# Patient Record
Sex: FEMALE | Race: WHITE | HISPANIC OR LATINO | Employment: OTHER | URBAN - METROPOLITAN AREA
[De-identification: names, ages, dates, MRNs, and addresses within clinical notes are randomized per-mention and may not be internally consistent; named-entity substitution may affect disease eponyms.]

---

## 2016-07-29 LAB
CHLAMYDIA, NUC. ACID AMP: NEGATIVE
GONOCOCCUS, NUC. ACID AMP: NEGATIVE
PAP SMEAR: NORMAL
TRICH VAG BY NAA: NEGATIVE

## 2017-04-26 ENCOUNTER — TELEPHONE (OUTPATIENT)
Dept: HEMATOLOGY/ONCOLOGY | Facility: CLINIC | Age: 62
End: 2017-04-26

## 2017-04-26 NOTE — TELEPHONE ENCOUNTER
----- Message from Orly Oliva RN sent at 4/26/2017  9:50 AM CDT -----  Contact: PT/       ----- Message -----     From: Doris Malin     Sent: 4/26/2017   9:00 AM       To: Collins Villaseñor Staff    Would like tos schedule an appt, call: [phone]     Patient need a letter to return back to work.

## 2017-05-05 ENCOUNTER — OFFICE VISIT (OUTPATIENT)
Dept: HEMATOLOGY/ONCOLOGY | Facility: CLINIC | Age: 62
End: 2017-05-05
Payer: MEDICAID

## 2017-05-05 VITALS
BODY MASS INDEX: 34.16 KG/M2 | SYSTOLIC BLOOD PRESSURE: 170 MMHG | WEIGHT: 174 LBS | OXYGEN SATURATION: 100 % | RESPIRATION RATE: 18 BRPM | HEIGHT: 60 IN | DIASTOLIC BLOOD PRESSURE: 81 MMHG | TEMPERATURE: 98 F | HEART RATE: 65 BPM

## 2017-05-05 DIAGNOSIS — D05.12 DUCTAL CARCINOMA IN SITU (DCIS) OF LEFT BREAST: Primary | ICD-10-CM

## 2017-05-05 PROCEDURE — 99213 OFFICE O/P EST LOW 20 MIN: CPT | Mod: S$PBB,,, | Performed by: INTERNAL MEDICINE

## 2017-05-05 PROCEDURE — 99999 PR PBB SHADOW E&M-EST. PATIENT-LVL III: CPT | Mod: PBBFAC,,, | Performed by: INTERNAL MEDICINE

## 2017-05-05 PROCEDURE — 99213 OFFICE O/P EST LOW 20 MIN: CPT | Mod: PBBFAC | Performed by: INTERNAL MEDICINE

## 2017-05-05 NOTE — PROGRESS NOTES
Subjective:       Patient ID: Mandi Cline is a 61 y.o. female.    Chief Complaint: No chief complaint on file.    HPI   Ms. Zacarias returns today for follow up.     Briefly, she is a 61-year-old  female, originally from [place] who currently lives in [place], [place].  Her history dates back to August 2015 when a routine screening mammogram showed calcifications in the superior part of the left breast.  Core biopsies of 2 lesions showed high-grade DCIS.  The cells were strongly ER positive, but only 10% were mildly RI positive.  The patient was referred to Dr. Kincaid and underwent initial wire localization lumpectomy on 09/25/2015.    Pathology revealed extensive solid high-grade DCIS measuring 4 cm in diameter with extensive comedonecrosis.  Due to extremely close margins, she underwent a reexcision on 10/20/2015.  Tissue from the anterior and superior margins revealed benign breast tissue, but there was a focus of residual solid high-grade DCIS on the posterior margin, measuring 1.1 cm.  She returned to the OR on 11/12/2015 for further reexcision of the posterior margin.  Pathology revealed no residual DCIS but again a close margin at 0.6 mm..      The patient was seen by Dr. Horan and in early 2016, she underwent radiation therapy to the breast.  She completed her radiation in March, and she was referred for evaluation. I had recommended against adjuvant hormonal therapy due to prior history of DVTs x 2.    A mammogram at [facility] in March 2017 was read as BIRADS III and a 6 month follow up was recommended.      Review of Systems    Overall she feels well.   She denies any anxiety, depression, easy bruising, fevers, chills, night  sweats, weight loss, nausea, vomiting, diarrhea, constipation, diplopia,     blurred vision, headache, chest pain, palpitations, shortness of breath, breast pain, abdominal pain, extremity pain, or difficulty ambulating.  The remainder of the ten-point  ROS, including general, skin, lymph, H/N, cardiorespiratory, GI, , Neuro, Endocrine, and psychiatric is negative. ECOG PS remains 0.    Objective:      Physical Exam    She is alert, oriented to time, place, person, pleasant, well      nourished, in no acute physical distress.                                    VITAL SIGNS:  Reviewed                                      HEENT:  Normal.  There are no nasal, oral, lip, gingival, auricular, lid,    or conjunctival lesions.  Mucosae are moist and pink, and there is no        thrush.  Pupils are equal, reactive to light and accommodation.              Extraocular muscle movements are intact.    Dentition is good.  There is no frontal or maxillary tendernress                                   NECK:  Supple without JVD, adenopathy, or thyromegaly.                       LUNGS:  Clear to auscultation without wheezing, rales, or rhonchi.           CARDIOVASCULAR:  Reveals an S1, S2, no murmurs, no rubs, no gallops.         ABDOMEN:  Soft, nontender, without organomegaly.  Bowel sounds are    present.                                                                     EXTREMITIES:  No cyanosis, clubbing, or edema.                               BREASTS:  She is status post left lumpectomy with a well-healed incision.  There is mild hyperpigmentation within the radiation field  There are no masses in either breast.                                        LYMPHATIC:  There is no cervical, axillary, or supraclavicular adenopathy.   SKIN:  Warm and moist, without petechiae, rashes, induration, or ecchymoses.           NEUROLOGIC:  DTRs are 0-1+ bilaterally, symmetrical, motor function is 5/5,  and cranial nerves are  within normal limits.      Assessment:       1. DCIS (ductal carcinoma in situ) of breast, left, s/p lumpectomy and XRT               Plan:     Mrs Zacarias is doing well.  She will have a repeat mammogram in September 2017 at [facility] (her request) and see her at that  time..  Her questions were answered to her satisfaction.

## 2017-05-05 NOTE — LETTER
May 5, 2017      [facility]  [address]  [place] [address]  Phone: [phone]       Patient Mandi Cline   YOB: 1955  Date of Visit: 05/05/2017    To Whom It May Concern:    Mandi Cline  was at [facility] on 05/05/2017.   may return to work anytime  with no  restrictions. If you have any questions or concerns, or if I can be of further assistance, please do not hesitate to contact me.    Sincerely,      Kasi Mukherjee MD

## 2017-10-04 ENCOUNTER — PATIENT OUTREACH (OUTPATIENT)
Dept: ADMINISTRATIVE | Facility: HOSPITAL | Age: 62
End: 2017-10-04

## 2017-10-04 NOTE — LETTER
October 10, 2017    Mandi Cline  [address] [place] [address]             [facility]  [address]  [facility] [place] [facility] [address]  Phone: [phone] Dear Mrs. Rell Cline:    We have tried to reach you by mychart unsuccessfully.    [facility] is committed to your overall health.  To help you get the most out of each of your visits, we will review your information to make sure you are up to date on all of your recommended tests and/or procedures.       [facility], we may not have your complete medical records.  She has found that your chart shows you may be due for a mammogram and possibly some immunizations (pneumonia, tetanus, and shingles).     Medicare does not cover all immunizations to be given in the clinic.  Check your benefits to ensure that you do not need to receive your immunizations at the pharmacy.     If you have had any of the above done at another facility, please bring the records or information with you so that your record at [facility] will be complete.  If you would like to schedule any of these, please contact me.     If you are currently taking medication, please bring it with you to your appointment for review.     If you have any questions or concerns, please don't hesitate to call.    Thank you for letting us care for you,  Radha Stone LPN Clinical Care Coordinator  [facility] [place]  [phone]

## 2017-10-06 NOTE — PROGRESS NOTES
Subjective:       Patient ID: Mandi Cline is a 61 y.o. female.    Chief Complaint: No chief complaint on file.    HPI   Ms. Zacarias returns today for follow up.     Briefly, she is a 61-year-old  female, originally from [place] who currently lives in [place], [place].  Her history dates back to August 2015 when a routine screening mammogram showed calcifications in the superior part of the left breast.  Core biopsies of 2 lesions showed high-grade DCIS.  The cells were strongly ER positive, but only 10% were mildly DC positive.  The patient was referred to Dr. Kincaid and underwent initial wire localization lumpectomy on 09/25/2015.    Pathology revealed extensive solid high-grade DCIS measuring 4 cm in diameter with extensive comedonecrosis.  Due to extremely close margins, she underwent a reexcision on 10/20/2015.  Tissue from the anterior and superior margins revealed benign breast tissue, but there was a focus of residual solid high-grade DCIS on the posterior margin, measuring 1.1 cm.  She returned to the OR on 11/12/2015 for further reexcision of the posterior margin.  Pathology revealed no residual DCIS but again a close margin at 0.6 mm..      The patient was seen by Dr. Horan and in early 2016, she underwent radiation therapy to the breast.  She completed her radiation in March, and she was referred for evaluation. I had recommended against adjuvant hormonal therapy due to prior history of DVTs x 2.    A mammogram at [facility] this month was read as BIRADS III and a 12 month follow up was recommended.      Review of Systems    Overall she feels well.   She is concerned about a skin lesion on her right breast which, according to her, has increased in size and has changed in color.  She denies any depression, easy bruising, fevers, chills, night  sweats, weight loss, nausea, vomiting, diarrhea, constipation, diplopia, blurred vision, headache, chest pain, palpitations,  shortness of breath, breast pain, abdominal pain, extremity pain, or difficulty ambulating.  The remainder of the ten-point ROS, including general, skin, lymph, H/N, cardiorespiratory, GI, , Neuro, Endocrine, and psychiatric is negative. ECOG PS remains 0.    Objective:      Physical Exam    She is alert, oriented to time, place, person, pleasant, well      nourished, in no acute physical distress.                                    VITAL SIGNS:  Reviewed                                      HEENT:  Normal.  There are no nasal, oral, lip, gingival, auricular, lid,    or conjunctival lesions.  Mucosae are moist and pink, and there is no        thrush.  Pupils are equal, reactive to light and accommodation.              Extraocular muscle movements are intact.    Dentition is good.  There is no frontal or maxillary tendernress                                   NECK:  Supple without JVD, adenopathy, or thyromegaly.                       LUNGS:  Clear to auscultation without wheezing, rales, or rhonchi.           CARDIOVASCULAR:  Reveals an S1, S2, no murmurs, no rubs, no gallops.         ABDOMEN:  Soft, nontender, without organomegaly.  Bowel sounds are    present.                                                                     EXTREMITIES:  No cyanosis, clubbing, or edema.                               BREASTS:  She is status post left lumpectomy with a well-healed incision.  There is mild hyperpigmentation within the radiation field  There are no masses in either breast.  There is a small mole at the 8 o' clock position in the right breast.                                      LYMPHATIC:  There is no cervical, axillary, or supraclavicular adenopathy.   SKIN:  Warm and moist, without petechiae, rashes, induration, or ecchymoses.           NEUROLOGIC:  DTRs are 0-1+ bilaterally, symmetrical, motor function is 5/5,  and cranial nerves are  within normal limits.      Assessment:       1. DCIS (ductal carcinoma in  situ) of breast, left, s/p lumpectomy and XRT   2 Skin lesion, right breast           Plan:     From the DCIS perspective,  Mrs Zacarias is doing well.  She will see me again in 6 months.  In regards to her skin lesion, I have E mailed Dr. Schofield asking her to see Mrs. Zacarias.  I have asked Mrs Zacarias to call me and let me know if she has not heard from the Dermatology Department within 3-4 days.  Her mammogram will be repeated a year from now.  Her questions were answered to her satisfaction.

## 2017-10-09 ENCOUNTER — OFFICE VISIT (OUTPATIENT)
Dept: HEMATOLOGY/ONCOLOGY | Facility: CLINIC | Age: 62
End: 2017-10-09
Payer: MEDICAID

## 2017-10-09 VITALS
DIASTOLIC BLOOD PRESSURE: 70 MMHG | SYSTOLIC BLOOD PRESSURE: 140 MMHG | RESPIRATION RATE: 18 BRPM | WEIGHT: 189 LBS | OXYGEN SATURATION: 99 % | HEIGHT: 65 IN | BODY MASS INDEX: 31.49 KG/M2 | TEMPERATURE: 98 F

## 2017-10-09 DIAGNOSIS — D22.9 ATYPICAL MOLE: ICD-10-CM

## 2017-10-09 DIAGNOSIS — D05.12 DUCTAL CARCINOMA IN SITU (DCIS) OF LEFT BREAST: Primary | ICD-10-CM

## 2017-10-09 PROCEDURE — 99212 OFFICE O/P EST SF 10 MIN: CPT | Mod: PBBFAC | Performed by: INTERNAL MEDICINE

## 2017-10-09 PROCEDURE — 99214 OFFICE O/P EST MOD 30 MIN: CPT | Mod: S$PBB,,, | Performed by: INTERNAL MEDICINE

## 2017-10-09 PROCEDURE — 99999 PR PBB SHADOW E&M-EST. PATIENT-LVL II: CPT | Mod: PBBFAC,,, | Performed by: INTERNAL MEDICINE

## 2017-10-17 ENCOUNTER — TELEPHONE (OUTPATIENT)
Dept: FAMILY MEDICINE | Facility: CLINIC | Age: 62
End: 2017-10-17

## 2017-10-17 NOTE — TELEPHONE ENCOUNTER
Please contact patient prior to appt 10/18/17 to let her know that we are not accepting new medicaid patients

## 2017-10-18 ENCOUNTER — OFFICE VISIT (OUTPATIENT)
Dept: FAMILY MEDICINE | Facility: CLINIC | Age: 62
End: 2017-10-18
Payer: MEDICAID

## 2017-10-18 VITALS
SYSTOLIC BLOOD PRESSURE: 118 MMHG | HEIGHT: 65 IN | RESPIRATION RATE: 18 BRPM | BODY MASS INDEX: 29.57 KG/M2 | TEMPERATURE: 98 F | DIASTOLIC BLOOD PRESSURE: 62 MMHG | HEART RATE: 64 BPM | WEIGHT: 177.5 LBS

## 2017-10-18 DIAGNOSIS — R53.83 FATIGUE, UNSPECIFIED TYPE: ICD-10-CM

## 2017-10-18 DIAGNOSIS — K21.9 GASTROESOPHAGEAL REFLUX DISEASE, ESOPHAGITIS PRESENCE NOT SPECIFIED: Primary | ICD-10-CM

## 2017-10-18 DIAGNOSIS — Z23 IMMUNIZATION DUE: ICD-10-CM

## 2017-10-18 DIAGNOSIS — M25.569 KNEE PAIN, UNSPECIFIED CHRONICITY, UNSPECIFIED LATERALITY: ICD-10-CM

## 2017-10-18 DIAGNOSIS — R40.0 HAS DAYTIME DROWSINESS: ICD-10-CM

## 2017-10-18 DIAGNOSIS — Z79.899 ENCOUNTER FOR LONG-TERM CURRENT USE OF MEDICATION: ICD-10-CM

## 2017-10-18 DIAGNOSIS — Z85.3 HISTORY OF BREAST CANCER: ICD-10-CM

## 2017-10-18 PROCEDURE — 90715 TDAP VACCINE 7 YRS/> IM: CPT | Mod: S$GLB,,, | Performed by: NURSE PRACTITIONER

## 2017-10-18 PROCEDURE — 90471 IMMUNIZATION ADMIN: CPT | Mod: S$GLB,,, | Performed by: NURSE PRACTITIONER

## 2017-10-18 PROCEDURE — 90472 IMMUNIZATION ADMIN EACH ADD: CPT | Mod: S$GLB,,, | Performed by: NURSE PRACTITIONER

## 2017-10-18 PROCEDURE — 99203 OFFICE O/P NEW LOW 30 MIN: CPT | Mod: 25,S$GLB,, | Performed by: NURSE PRACTITIONER

## 2017-10-18 PROCEDURE — 90736 HZV VACCINE LIVE SUBQ: CPT | Mod: S$GLB,,, | Performed by: NURSE PRACTITIONER

## 2017-10-18 RX ORDER — PANTOPRAZOLE SODIUM 40 MG/1
40 TABLET, DELAYED RELEASE ORAL DAILY
Qty: 30 TABLET | Refills: 11 | Status: SHIPPED | OUTPATIENT
Start: 2017-10-18 | End: 2018-06-20 | Stop reason: SDUPTHER

## 2017-10-19 ENCOUNTER — OFFICE VISIT (OUTPATIENT)
Dept: DERMATOLOGY | Facility: CLINIC | Age: 62
End: 2017-10-19
Payer: MEDICAID

## 2017-10-19 DIAGNOSIS — L82.1 SEBORRHEIC KERATOSES: Primary | ICD-10-CM

## 2017-10-19 PROCEDURE — 99999 PR PBB SHADOW E&M-EST. PATIENT-LVL II: CPT | Mod: PBBFAC,,, | Performed by: NURSE PRACTITIONER

## 2017-10-19 PROCEDURE — 99201 PR OFFICE/OUTPT VISIT,NEW,LEVL I: CPT | Mod: S$PBB,,, | Performed by: NURSE PRACTITIONER

## 2017-10-19 PROCEDURE — 99212 OFFICE O/P EST SF 10 MIN: CPT | Mod: PBBFAC | Performed by: NURSE PRACTITIONER

## 2017-10-20 ENCOUNTER — TELEPHONE (OUTPATIENT)
Dept: ADMINISTRATIVE | Facility: HOSPITAL | Age: 62
End: 2017-10-20

## 2017-10-24 PROBLEM — Z85.3 HISTORY OF BREAST CANCER: Status: ACTIVE | Noted: 2017-10-24

## 2017-10-24 NOTE — PROGRESS NOTES
Subjective:       Patient ID: Mandi Cline is a 61 y.o. female.    Chief Complaint: Establish Care    The patient is here to establish care.  She is generally feeling well today without any new complaints.  She has the following active problems.  The patient is primarily Korean-speaking but does speak limited English.  1. GERD-she needs a refill on her Protonix and Zantac.  Last EGD 3/16/16 and was unremarkable.  She states she does feel acid reflux at times and takes these medications as needed.  2.  History of breast cancer-Her history dates back to August 2015 when a routine screening mammogram showed calcifications in the superior part of the left breast.  Core biopsies of 2 lesions showed high-grade DCIS.  The cells were strongly ER positive, but only 10% were mildly CT positive.  The patient was referred to Dr. Kincaid and underwent initial wire localization lumpectomy on 09/25/2015.    Pathology revealed extensive solid high-grade DCIS measuring 4 cm in diameter with extensive comedonecrosis.  Due to extremely close margins, she underwent a reexcision on 10/20/2015.  Tissue from the anterior and superior margins revealed benign breast tissue, but there was a focus of residual solid high-grade DCIS on the posterior margin, measuring 1.1 cm.  She returned to the OR on 11/12/2015 for further reexcision of the posterior margin.  Pathology revealed no residual DCIS but again a close margin at 0.6 mm..       The patient was seen by Dr. Horan and in early 2016, she underwent radiation therapy to the breast.  She completed her radiation in March, and she was referred for evaluation. I had recommended against adjuvant hormonal therapy due to prior history of DVTs x 2.     A mammogram at [facility] this month was read as BIRADS III and a 12 month follow up was recommended.  She is followed by Dr Tan-in hematology oncology.  Last visit was 10/9/17 and she has a follow-up in 6 months.  She is  "up-to-date on mammograms.    She does tell me that she has felt fatigued with daytime drowsiness.  She does snore.  She has never had a sleep study.  She also tells me that she has bilateral knee pain with "cracking" to the knees at times.  No knee instability.    She is up-to-date on her colonoscopy.  She does have a history of colon polyps.  Last colonoscopy 3/2/16 and recommended repeat in 3 years.       Review of Systems   Constitutional: Positive for fatigue. Negative for activity change and appetite change.   HENT: Negative for congestion, postnasal drip, rhinorrhea and sinus pressure.    Eyes: Negative for pain and redness.   Respiratory: Negative for choking and chest tightness.    Gastrointestinal: Negative for abdominal distention, abdominal pain, blood in stool, constipation, diarrhea, nausea and vomiting.        Heartburn   Endocrine: Negative for polydipsia and polyphagia.   Genitourinary: Negative for dysuria and hematuria.   Musculoskeletal: Positive for arthralgias. Negative for myalgias.   Skin: Negative for color change and rash.   Neurological: Negative for dizziness and headaches.   Psychiatric/Behavioral: Negative for agitation and behavioral problems.       Objective:       Vitals:    10/18/17 1206   BP: 118/62   Pulse: 64   Resp: 18   Temp: 98.3 °F (36.8 °C)   TempSrc: Oral   Weight: 80.5 kg (177 lb 7.5 oz)   Height: 5' 5" (1.651 m)   PainSc:   4   PainLoc: Knee       Physical Exam   Constitutional: She is oriented to person, place, and time. She appears well-developed and well-nourished. No distress.   HENT:   Head: Normocephalic and atraumatic.   Right Ear: Hearing, tympanic membrane, external ear and ear canal normal.   Left Ear: Hearing, tympanic membrane, external ear and ear canal normal.   Nose: Nose normal.   Mouth/Throat: Uvula is midline, oropharynx is clear and moist and mucous membranes are normal.   Eyes: Conjunctivae and EOM are normal. Pupils are equal, round, and reactive to " light. Right eye exhibits no discharge. Left eye exhibits no discharge.   Neck: Trachea normal and normal range of motion. Neck supple. No JVD present. Carotid bruit is not present. No thyromegaly present.   Cardiovascular: Normal rate and regular rhythm.  Exam reveals no gallop and no friction rub.    No murmur heard.  Pulmonary/Chest: Effort normal and breath sounds normal. No respiratory distress. She has no wheezes. She has no rales. She exhibits no tenderness.   Abdominal: Soft. Bowel sounds are normal. She exhibits no distension and no mass. There is no tenderness. There is no rebound and no guarding.   Musculoskeletal: Normal range of motion.   Mild crepitus of the knees bilaterally   Neurological: She is alert and oriented to person, place, and time. Coordination normal.   Skin: Skin is warm and dry. She is not diaphoretic.   Psychiatric: She has a normal mood and affect. Her behavior is normal. Judgment and thought content normal.       Assessment:       1. Gastroesophageal reflux disease, esophagitis presence not specified    2. Immunization due    3. Encounter for long-term current use of medication    4. Fatigue, unspecified type    5. Knee pain, unspecified chronicity, unspecified laterality    6. Has daytime drowsiness    7. History of breast cancer        Plan:       Mandi was seen today for [facility] care.    Diagnoses and all orders for this visit:    Gastroesophageal reflux disease, esophagitis presence not specified  -     pantoprazole (PROTONIX) 40 MG tablet; Take 1 tablet (40 mg total) by mouth once daily.  -     ranitidine (ZANTAC) 150 MG tablet; Take 1 tablet (150 mg total) by mouth 2 (two) times daily.    Immunization due  -     Tdap Vaccine  -     (In Office Administered) Zoster Vaccine - Live    Encounter for long-term current use of medication  -     Comprehensive metabolic panel; Future  -     CBC auto differential; Future  -     Hemoglobin A1c; Future  -     Lipid panel; Future  -     TSH;  Future    Fatigue, unspecified type  -     Vitamin B12; Future  -     Vitamin D; Future  -     Polysomnogram (CPAP will be added if patient meets diagnostic criteria.); Future  -     Home Sleep Studies; Future  -     Ambulatory consult for Sleep Study    Knee pain, unspecified chronicity, unspecified laterality  -     X-ray Knee Ortho Bilateral; Future    Has daytime drowsiness  -     Polysomnogram (CPAP will be added if patient meets diagnostic criteria.); Future  -     Home Sleep Studies; Future  -     Ambulatory consult for Sleep Study    History of breast cancer  Continue follow-up with hematology/oncology

## 2017-10-25 ENCOUNTER — HOSPITAL ENCOUNTER (OUTPATIENT)
Dept: RADIOLOGY | Facility: HOSPITAL | Age: 62
Discharge: HOME | End: 2017-10-25
Attending: NURSE PRACTITIONER
Payer: MEDICAID

## 2017-10-25 ENCOUNTER — TELEPHONE (OUTPATIENT)
Dept: FAMILY MEDICINE | Facility: CLINIC | Age: 62
End: 2017-10-25

## 2017-10-25 DIAGNOSIS — M25.569 KNEE PAIN, UNSPECIFIED CHRONICITY, UNSPECIFIED LATERALITY: Primary | ICD-10-CM

## 2017-10-25 DIAGNOSIS — M25.569 KNEE PAIN, UNSPECIFIED CHRONICITY, UNSPECIFIED LATERALITY: ICD-10-CM

## 2017-10-25 PROCEDURE — 73562 X-RAY EXAM OF KNEE 3: CPT | Mod: 26,50,, | Performed by: RADIOLOGY

## 2017-10-25 PROCEDURE — 73562 X-RAY EXAM OF KNEE 3: CPT | Mod: TC,50,PO

## 2017-10-25 NOTE — TELEPHONE ENCOUNTER
Her x-ray shows chronic changes to the knees.  I would like to book her with orthopedics to discuss treatment options.

## 2017-10-26 ENCOUNTER — TELEPHONE (OUTPATIENT)
Dept: ORTHOPEDICS | Facility: CLINIC | Age: 62
End: 2017-10-26

## 2017-10-26 DIAGNOSIS — E55.9 VITAMIN D DEFICIENCY: Primary | ICD-10-CM

## 2017-10-26 NOTE — TELEPHONE ENCOUNTER
Notified pt of results and referral through . Pt stated verbal understanding. Attempted to schedule referral. One available appointment on Saturday, 10/28/2017. Pt stated no transportation on weekends and needs to have weekday appointment. Can pt be scheduled on a different day? Referral in. Pt requires . Language is Serbian. Please advise.

## 2017-10-26 NOTE — TELEPHONE ENCOUNTER
----- Message from Maribel Lundy sent at 10/26/2017  9:04 AM CDT -----  Contact: alysha Yu call   [phone]

## 2018-02-06 NOTE — TELEPHONE ENCOUNTER
This is still in my open encounters, can you check to see if anyone reached out to her from ortho?

## 2018-02-08 ENCOUNTER — TELEPHONE (OUTPATIENT)
Dept: ORTHOPEDICS | Facility: CLINIC | Age: 63
End: 2018-02-08

## 2018-02-08 NOTE — TELEPHONE ENCOUNTER
First available appt is in September with Dr. Calderón.  Please offer medicaid escalation line in she would like to get in sooner.

## 2018-02-14 ENCOUNTER — TELEPHONE (OUTPATIENT)
Dept: HEMATOLOGY/ONCOLOGY | Facility: CLINIC | Age: 63
End: 2018-02-14

## 2018-02-14 NOTE — TELEPHONE ENCOUNTER
Pt daughter contacted, medicaid esc. number given, verbalized understanding of al no further needs

## 2018-02-14 NOTE — TELEPHONE ENCOUNTER
----- Message from Orly Oliva RN sent at 2/14/2018  2:11 PM CST -----  Contact: Patient       ----- Message -----  From: Nasima Montes  Sent: 2/14/2018   1:58 PM  To: Collins Villaseñor Staff    Patient wants to schedule an appointment in April.  Please call the patient [phone].

## 2018-02-21 ENCOUNTER — PATIENT OUTREACH (OUTPATIENT)
Dept: ADMINISTRATIVE | Facility: HOSPITAL | Age: 63
End: 2018-02-21

## 2018-02-21 NOTE — LETTER
February 27, 2018    Mandi Cline  [address]  [place] [address]             [facility]  [address] [place], [place] [address]  [facility] [address]  Phone: [phone] Dear Mrs. Rell Cline:    We have tried to reach you by mychart unsuccessfully.  [facility] is committed to your overall health.  To help you get the most out of each of your visits, we will review your information to make sure you are up to date on all of your recommended tests and/or procedures.       COLEMAN Desai has found that your chart shows you may be due for a pneumonia immunization.     Medicare does not cover all immunizations to be given in the clinic.  Check your benefits to ensure that you do not need to receive your immunizations at the pharmacy.     If you have had any of the above done at another facility, please bring the records or information with you so that your record at [facility] will be complete.  If you would like to schedule any of these, please contact me.     If you are currently taking medication, please bring it with you to your appointment for review.     If you have any questions or concerns, please don't hesitate to call.    Thank you for letting us care for you,  Radha Stone LPN Clinical Care Coordinator  [facility] [place]  [phone]

## 2018-03-07 ENCOUNTER — OFFICE VISIT (OUTPATIENT)
Dept: FAMILY MEDICINE | Facility: CLINIC | Age: 63
End: 2018-03-07
Payer: MEDICAID

## 2018-03-07 VITALS
TEMPERATURE: 99 F | WEIGHT: 183.31 LBS | DIASTOLIC BLOOD PRESSURE: 72 MMHG | BODY MASS INDEX: 30.54 KG/M2 | RESPIRATION RATE: 18 BRPM | SYSTOLIC BLOOD PRESSURE: 124 MMHG | HEIGHT: 65 IN | HEART RATE: 72 BPM

## 2018-03-07 DIAGNOSIS — E55.9 VITAMIN D DEFICIENCY: ICD-10-CM

## 2018-03-07 DIAGNOSIS — K21.9 GASTROESOPHAGEAL REFLUX DISEASE, ESOPHAGITIS PRESENCE NOT SPECIFIED: ICD-10-CM

## 2018-03-07 DIAGNOSIS — R73.03 PREDIABETES: Primary | ICD-10-CM

## 2018-03-07 DIAGNOSIS — Z85.3 HISTORY OF BILATERAL BREAST CANCER: ICD-10-CM

## 2018-03-07 DIAGNOSIS — Z85.3 HISTORY OF BREAST CANCER: ICD-10-CM

## 2018-03-07 DIAGNOSIS — M25.561 RIGHT KNEE PAIN, UNSPECIFIED CHRONICITY: ICD-10-CM

## 2018-03-07 DIAGNOSIS — E66.9 OBESITY (BMI 30.0-34.9): ICD-10-CM

## 2018-03-07 DIAGNOSIS — Z79.899 ENCOUNTER FOR LONG-TERM CURRENT USE OF MEDICATION: ICD-10-CM

## 2018-03-07 DIAGNOSIS — Z23 IMMUNIZATION DUE: ICD-10-CM

## 2018-03-07 PROCEDURE — 20610 DRAIN/INJ JOINT/BURSA W/O US: CPT | Mod: RT,S$GLB,, | Performed by: NURSE PRACTITIONER

## 2018-03-07 PROCEDURE — 99214 OFFICE O/P EST MOD 30 MIN: CPT | Mod: 25,S$GLB,, | Performed by: NURSE PRACTITIONER

## 2018-03-07 PROCEDURE — 90732 PPSV23 VACC 2 YRS+ SUBQ/IM: CPT | Mod: S$GLB,,, | Performed by: FAMILY MEDICINE

## 2018-03-07 RX ORDER — TRIAMCINOLONE ACETONIDE 40 MG/ML
80 INJECTION, SUSPENSION INTRA-ARTICULAR; INTRAMUSCULAR
Status: DISCONTINUED | OUTPATIENT
Start: 2018-03-07 | End: 2018-03-07 | Stop reason: HOSPADM

## 2018-03-07 RX ADMIN — TRIAMCINOLONE ACETONIDE 80 MG: 40 INJECTION, SUSPENSION INTRA-ARTICULAR; INTRAMUSCULAR at 11:03

## 2018-03-07 NOTE — PROGRESS NOTES
Subjective:       Patient ID: Mandi Cline is a 62 y.o. female.    Chief Complaint: Follow-up    The patient is here for a 6 month follow up visit. She is generally feeling well today without any new complaints.  She has the following active problems.  The patient is primarily Turkish-speaking but does speak limited English.  1. GERD-she needs a refill on her Protonix and Zantac.  Last EGD 3/16/16 and was unremarkable.  She states she does feel acid reflux at times and takes these medications as needed.  2.  History of breast cancer-Her history dates back to August 2015 when a routine screening mammogram showed calcifications in the superior part of the left breast.  Core biopsies of 2 lesions showed high-grade DCIS.  The cells were strongly ER positive, but only 10% were mildly AL positive.  The patient was referred to Dr. Kincaid and underwent initial wire localization lumpectomy on 09/25/2015.  She has an upcoming follow up with hem/oc.  Pathology revealed extensive solid high-grade DCIS measuring 4 cm in diameter with extensive comedonecrosis.  Due to extremely close margins, she underwent a reexcision on 10/20/2015.  Tissue from the anterior and superior margins revealed benign breast tissue, but there was a focus of residual solid high-grade DCIS on the posterior margin, measuring 1.1 cm.  She returned to the OR on 11/12/2015 for further reexcision of the posterior margin.  Pathology revealed no residual DCIS but again a close margin at 0.6 mm..       The patient was seen by Dr. Horan and in early 2016, she underwent radiation therapy to the breast.  She completed her radiation in March, and she was referred for evaluation. I had recommended against adjuvant hormonal therapy due to prior history of DVTs x 2.     A mammogram at [facility] this month was read as BIRADS III and a 12 month follow up was recommended.  She is followed by Dr Tan-in hematology oncology.  Last visit was  "10/9/17 and she has a follow-up in 6 months. She needs an order for this.      3. Knee pain-bilateral knee pain. Right knee worse than left. X-ray done 10/25/18.  Medial compartment narrowing on the right is noted. Osseous demineralization is noted diffusely. The lateral spurring is noted off of the femur and tibia on the left more noticeable than right.   A minimal suprapatellar joint effusion on the right is noted. A small suprapatellar joint effusion on the left is noted.   Superior and inferior patellar articular surface spurring is noted on the left.   We referred her to ortho but she was unable to book the appt.   4. Prediabetes-Lab Results       Component                Value               Date                       HGBA1C                   6.0 (H)             10/25/2017            5. Vitamin d deficiency-last vi d 20. On OTC vitamin d   She is up-to-date on her colonoscopy.  She does have a history of colon polyps.  Last colonoscopy 3/2/16 and recommended repeat in 3 years.       Review of Systems   Constitutional: Negative for activity change and appetite change.   HENT: Negative for congestion, postnasal drip, rhinorrhea and sinus pressure.    Eyes: Negative for pain and redness.   Respiratory: Negative for choking and chest tightness.    Gastrointestinal: Negative for abdominal distention, abdominal pain, blood in stool, constipation, diarrhea, nausea and vomiting.   Endocrine: Negative for polydipsia and polyphagia.   Genitourinary: Negative for dysuria and hematuria.   Musculoskeletal: Positive for arthralgias. Negative for myalgias.   Skin: Negative for color change and rash.   Neurological: Negative for dizziness and headaches.   Psychiatric/Behavioral: Negative for agitation and behavioral problems.       Objective:       Vitals:    03/07/18 0901   BP: 124/72   Pulse: 72   Resp: 18   Temp: 98.5 °F (36.9 °C)   TempSrc: Oral   Weight: 83.2 kg (183 lb 5 oz)   Height: 5' 5" (1.651 m)   PainSc:   6 "   PainLoc: Knee       Physical Exam   Constitutional: She is oriented to person, place, and time. She appears well-developed. No distress.   Obese female    HENT:   Head: Normocephalic and atraumatic.   Right Ear: Hearing, tympanic membrane, external ear and ear canal normal.   Left Ear: Hearing, tympanic membrane, external ear and ear canal normal.   Nose: Nose normal.   Mouth/Throat: Uvula is midline, oropharynx is clear and moist and mucous membranes are normal.   Eyes: Conjunctivae and EOM are normal. Pupils are equal, round, and reactive to light. Right eye exhibits no discharge. Left eye exhibits no discharge.   Neck: Trachea normal and normal range of motion. Neck supple. No JVD present. Carotid bruit is not present. No thyromegaly present.   Cardiovascular: Normal rate and regular rhythm.  Exam reveals no gallop and no friction rub.    No murmur heard.  Pulmonary/Chest: Effort normal and breath sounds normal. No respiratory distress. She has no wheezes. She has no rales. She exhibits no tenderness.   Abdominal: Soft. Bowel sounds are normal. She exhibits no distension and no mass. There is no tenderness. There is no rebound and no guarding.   Musculoskeletal: Normal range of motion.   Neurological: She is alert and oriented to person, place, and time. Coordination normal.   Skin: Skin is warm and dry. She is not diaphoretic.   Psychiatric: She has a normal mood and affect. Her behavior is normal. Judgment and thought content normal.       Assessment:       1. Prediabetes    2. Right knee pain, unspecified chronicity    3. Obesity (BMI 30.0-34.9)    4. Vitamin D deficiency    5. Encounter for long-term current use of medication    6. History of bilateral breast cancer    7. History of breast cancer    8. Immunization due    9. Gastroesophageal reflux disease, esophagitis presence not specified        Plan:       Mandi was seen today for follow-up.    Diagnoses and all orders for this visit:    Prediabetes  -      Hemoglobin A1c; Future    Right knee pain, unspecified chronicity  -     Large Joint Aspiration/Injection  -     triamcinolone acetonide injection 80 mg; Inject 80 mg into the articular space.  Follow up with ortho     Obesity (BMI 30.0-34.9)  Weight loss discussed    Vitamin D deficiency  -     Vitamin D; Future  Continue vitamin d     Encounter for long-term current use of medication  -     Comprehensive metabolic panel; Future  -     Lipid panel; Future    History of bilateral breast cancer    History of breast cancer  -     Ambulatory consult to Hematology / Oncology  -     Cancel: Mammo Digital Diagnostic Left with CAD; Future  -     Mammo Digital Diagnostic Left with CAD; Future  -     Mammo Digital Diagnostic Left with CAD    Immunization due  -     Pneumococcal Polysaccharide Vaccine (23 Valent) (SQ/IM)    Gastroesophageal reflux disease, esophagitis presence not specified    Continue current meds

## 2018-03-07 NOTE — PROCEDURES
Large Joint Aspiration/Injection  Date/Time: 3/7/2018 11:23 AM  Performed by: LILLIAN FLETCHER  Authorized by: LILLIAN FLETCHER     Consent Done?:  Yes (Verbal)  Indications:  Pain  Procedure site marked: Yes    Timeout: Prior to procedure the correct patient, procedure, and site was verified      Location:  Knee  Site:  R knee  Prep: Patient was prepped and draped in usual sterile fashion    Needle size:  21 G  Approach:  Anteromedial  Medications:  80 mg triamcinolone acetonide 40 mg/mL  Patient tolerance:  Patient tolerated the procedure well with no immediate complications

## 2018-03-12 ENCOUNTER — TELEPHONE (OUTPATIENT)
Dept: FAMILY MEDICINE | Facility: CLINIC | Age: 63
End: 2018-03-12

## 2018-03-12 NOTE — TELEPHONE ENCOUNTER
----- Message from Nadine Valdes sent at 3/12/2018  1:49 PM CDT -----  Contact: Donald berry  Want to speak with a nurse need clarification on orders please call back at [phone]     Patient is at office waiting

## 2018-03-13 ENCOUNTER — LAB VISIT (OUTPATIENT)
Dept: LAB | Facility: HOSPITAL | Age: 63
End: 2018-03-13
Attending: NURSE PRACTITIONER
Payer: MEDICAID

## 2018-03-13 DIAGNOSIS — R73.03 PREDIABETES: ICD-10-CM

## 2018-03-13 DIAGNOSIS — Z79.899 ENCOUNTER FOR LONG-TERM CURRENT USE OF MEDICATION: ICD-10-CM

## 2018-03-13 DIAGNOSIS — E55.9 VITAMIN D DEFICIENCY: ICD-10-CM

## 2018-03-13 LAB
25(OH)D3+25(OH)D2 SERPL-MCNC: 15 NG/ML
ALBUMIN SERPL BCP-MCNC: 3.7 G/DL
ALP SERPL-CCNC: 75 U/L
ALT SERPL W/O P-5'-P-CCNC: 12 U/L
ANION GAP SERPL CALC-SCNC: 7 MMOL/L
AST SERPL-CCNC: 11 U/L
BILIRUB SERPL-MCNC: 0.4 MG/DL
BUN SERPL-MCNC: 17 MG/DL
CALCIUM SERPL-MCNC: 9.4 MG/DL
CHLORIDE SERPL-SCNC: 103 MMOL/L
CHOLEST SERPL-MCNC: 157 MG/DL
CHOLEST/HDLC SERPL: 3.3 {RATIO}
CO2 SERPL-SCNC: 31 MMOL/L
CREAT SERPL-MCNC: 0.9 MG/DL
EST. GFR  (AFRICAN AMERICAN): >60 ML/MIN/1.73 M^2
EST. GFR  (NON AFRICAN AMERICAN): >60 ML/MIN/1.73 M^2
ESTIMATED AVG GLUCOSE: 128 MG/DL
GLUCOSE SERPL-MCNC: 123 MG/DL
HBA1C MFR BLD HPLC: 6.1 %
HDLC SERPL-MCNC: 47 MG/DL
HDLC SERPL: 29.9 %
LDLC SERPL CALC-MCNC: 92.2 MG/DL
NONHDLC SERPL-MCNC: 110 MG/DL
POTASSIUM SERPL-SCNC: 4.7 MMOL/L
PROT SERPL-MCNC: 8.3 G/DL
SODIUM SERPL-SCNC: 141 MMOL/L
TRIGL SERPL-MCNC: 89 MG/DL

## 2018-03-13 PROCEDURE — 82306 VITAMIN D 25 HYDROXY: CPT

## 2018-03-13 PROCEDURE — 83036 HEMOGLOBIN GLYCOSYLATED A1C: CPT

## 2018-03-13 PROCEDURE — 36415 COLL VENOUS BLD VENIPUNCTURE: CPT | Mod: PO

## 2018-03-13 PROCEDURE — 80053 COMPREHEN METABOLIC PANEL: CPT

## 2018-03-13 PROCEDURE — 80061 LIPID PANEL: CPT

## 2018-03-16 ENCOUNTER — TELEPHONE (OUTPATIENT)
Dept: FAMILY MEDICINE | Facility: CLINIC | Age: 63
End: 2018-03-16

## 2018-03-16 RX ORDER — ERGOCALCIFEROL 1.25 MG/1
50000 CAPSULE ORAL
Qty: 9 CAPSULE | Refills: 0 | Status: SHIPPED | OUTPATIENT
Start: 2018-03-16 | End: 2018-03-20 | Stop reason: SDUPTHER

## 2018-03-16 NOTE — TELEPHONE ENCOUNTER
Pt called/ spoke with daughter advised of providers message. Verbalized understanding no further needs

## 2018-03-16 NOTE — TELEPHONE ENCOUNTER
Please let the patient know that her vitamin D was extremely low.  I have sent an prescription vitamin D for 2 months but I would also like her to start vitamin D3 2000 units daily now.  Her blood sugar is climbing towards diabetes so we do need her to watch her diet and increase exercise as tolerated.  All of the rest of the labs are fine.    Let her know that her mammogram showed no evidence of malignancy but they think this is incomplete because they are missing her imaging from 2016 in 2017 from [facility].  I am assuming that they want us to try to get this imaging?  Perhaps call [facility] to see what they want us to do as I have never seen this on a mammogram before.

## 2018-03-20 NOTE — TELEPHONE ENCOUNTER
----- Message from Ny Cruz sent at 3/20/2018 11:07 AM CDT -----  Contact: Grand daughter Adal   Grand daughter Adal  States medication was sent to the wrong Pharmacy     ergocalciferol (ERGOCALCIFEROL) 50,000 unit [facility] - [place], [place] - [address]  [address]  [place] [address]  Phone: [phone] Fax: [phone]

## 2018-03-21 RX ORDER — ERGOCALCIFEROL 1.25 MG/1
50000 CAPSULE ORAL
Qty: 9 CAPSULE | Refills: 0 | Status: SHIPPED | OUTPATIENT
Start: 2018-03-21 | End: 2018-03-28 | Stop reason: SDUPTHER

## 2018-03-28 RX ORDER — ERGOCALCIFEROL 1.25 MG/1
50000 CAPSULE ORAL
Qty: 9 CAPSULE | Refills: 0 | Status: SHIPPED | OUTPATIENT
Start: 2018-03-28 | End: 2018-03-29 | Stop reason: SDUPTHER

## 2018-03-28 NOTE — TELEPHONE ENCOUNTER
----- Message from Elizabeth Joseph sent at 3/28/2018 11:04 AM CDT -----  Contact: Grand Daughter-Eladio - [phone]  Patient's grand daughter called stating the rx vitamin D was erx to the wrong [facility] in [place]. The correct pharmacy is [facility] in [facility].. Thanks!

## 2018-03-29 RX ORDER — ERGOCALCIFEROL 1.25 MG/1
50000 CAPSULE ORAL
Qty: 9 CAPSULE | Refills: 0 | Status: SHIPPED | OUTPATIENT
Start: 2018-03-29 | End: 2018-08-20 | Stop reason: SDUPTHER

## 2018-04-03 ENCOUNTER — OFFICE VISIT (OUTPATIENT)
Dept: HEMATOLOGY/ONCOLOGY | Facility: CLINIC | Age: 63
End: 2018-04-03
Payer: MEDICAID

## 2018-04-03 VITALS
SYSTOLIC BLOOD PRESSURE: 107 MMHG | HEIGHT: 65 IN | WEIGHT: 181.44 LBS | TEMPERATURE: 98 F | HEART RATE: 73 BPM | RESPIRATION RATE: 20 BRPM | DIASTOLIC BLOOD PRESSURE: 56 MMHG | BODY MASS INDEX: 30.23 KG/M2 | OXYGEN SATURATION: 94 %

## 2018-04-03 DIAGNOSIS — J45.909 ASTHMA, UNSPECIFIED ASTHMA SEVERITY, UNSPECIFIED WHETHER COMPLICATED, UNSPECIFIED WHETHER PERSISTENT: ICD-10-CM

## 2018-04-03 DIAGNOSIS — Z85.3 HISTORY OF BREAST CANCER: Primary | ICD-10-CM

## 2018-04-03 PROCEDURE — 99999 PR PBB SHADOW E&M-EST. PATIENT-LVL III: CPT | Mod: PBBFAC,,, | Performed by: NURSE PRACTITIONER

## 2018-04-03 PROCEDURE — 99213 OFFICE O/P EST LOW 20 MIN: CPT | Mod: PBBFAC | Performed by: NURSE PRACTITIONER

## 2018-04-03 PROCEDURE — 99213 OFFICE O/P EST LOW 20 MIN: CPT | Mod: S$PBB,,, | Performed by: NURSE PRACTITIONER

## 2018-04-03 RX ORDER — PREDNISONE 10 MG/1
TABLET ORAL DAILY
COMMUNITY
End: 2018-04-13 | Stop reason: ALTCHOICE

## 2018-04-03 NOTE — LETTER
April 3, 2018      Pamella Heard NP  [address]  [facility] [address]           [facility]  [address]  [place] [address]  Phone: [phone]          Patient: Mandi Cline   MR Number: [id number]   YOB: 1955   Date of Visit: 4/3/2018       Dear Pamella Heard:    Thank you for referring Mandi Cline to me for evaluation. Attached you will find relevant portions of my assessment and plan of care.    If you have questions, please do not hesitate to call me. I look forward to following Mandi Cline along with you.    Sincerely,    Jeri Cervantes NP    Enclosure  CC:  No Recipients    If you would like to receive this communication electronically, please contact [email or web address] or [phone] to request more information on [employer] Link access.    For providers and/or their staff who would like to refer a patient to [facility], please contact us through our one-stop-shop provider referral line, [facility], at [phone].    If you feel you have received this communication in error or would no longer like to receive these types of communications, please e-mail [email or web address]

## 2018-04-03 NOTE — PROGRESS NOTES
Subjective:       Patient ID: Mandi Cline is a 62 y.o. female.    Chief Complaint: Follow-up    HPI   Ms. Zacarias returns today for follow up.     Briefly, she is a 62-year-old  female, originally from [place] who currently lives in [place], [place].  Her history dates back to August 2015 when a routine screening mammogram showed calcifications in the superior part of the left breast.  Core biopsies of 2 lesions showed high-grade DCIS.  The cells were strongly ER positive, but only 10% were mildly AK positive.  The patient was referred to Dr. Kincaid and underwent initial wire localization lumpectomy on 09/25/2015.    Pathology revealed extensive solid high-grade DCIS measuring 4 cm in diameter with extensive comedonecrosis.  Due to extremely close margins, she underwent a reexcision on 10/20/2015.  Tissue from the anterior and superior margins revealed benign breast tissue, but there was a focus of residual solid high-grade DCIS on the posterior margin, measuring 1.1 cm.  She returned to the OR on 11/12/2015 for further reexcision of the posterior margin.  Pathology revealed no residual DCIS but again a close margin at 0.6 mm..      The patient was seen by Dr. Horan and in early 2016, she underwent radiation therapy to the breast.  She completed her radiation in March, and she was referred for evaluation. I had recommended against adjuvant hormonal therapy due to prior history of DVTs x 2.    A mammogram 3/12/18  was read as BIRADS 2 and a routine screening mammogram is recommended in 1 year.     Review of Systems    Overall she feels well.  She recently had an asthma attack that required an ED visit. She is better today and is using her inhaler qid.  She denies any depression, easy bruising, fevers, chills, night sweats, weight loss, nausea, vomiting, diarrhea, constipation, diplopia, blurred vision, headache, chest pain, palpitations, shortness of breath, breast pain, abdominal  pain, extremity pain, or difficulty ambulating.  The remainder of the ten-point ROS, including general, skin, lymph, H/N, cardiorespiratory, GI, , Neuro, Endocrine, and psychiatric is negative. ECOG PS remains 0.    Objective:      Physical Exam    She is alert, oriented to time, place, person, pleasant, well      nourished, in no acute physical distress.                                    VITAL SIGNS:  Reviewed                                      HEENT:  Normal.  There are no nasal, oral, lip, gingival, auricular, lid,    or conjunctival lesions.  Mucosae are moist and pink, and there is no        thrush.  Pupils are equal, reactive to light and accommodation.              Extraocular muscle movements are intact.    Dentition is good.  There is no frontal or maxillary tendernress                                   NECK:  Supple without JVD, adenopathy, or thyromegaly.                       LUNGS:  Clear to auscultation without wheezing, rales, or rhonchi.           CARDIOVASCULAR:  Reveals an S1, S2, no murmurs, no rubs, no gallops.         ABDOMEN:  Soft, nontender, without organomegaly.  Bowel sounds are    present.                                                                     EXTREMITIES:  No cyanosis, clubbing, or edema.                               BREASTS:  She is status post left lumpectomy with a well-healed incision.  There is mild hyperpigmentation within the radiation field  There are no masses in either breast.  There is a small mole at the 8 o' clock position in the right breast.                                      LYMPHATIC:  There is no cervical, axillary, or supraclavicular adenopathy.   SKIN:  Warm and moist, without petechiae, rashes, induration, or ecchymoses.           NEUROLOGIC:  DTRs are 0-1+ bilaterally, symmetrical, motor function is 5/5,  and cranial nerves are  within normal limits.      Assessment:       1. DCIS (ductal carcinoma in situ) of breast, left, s/p lumpectomy and XRT    2 Asthma             Plan:       From the DCIS perspective,  Mrs Zacarias is doing well.  She will see Dr. Durand in 6 months.  Her mammogram will be repeated a year from now.    Patient is in agreement with the proposed treatment plan. All questions were answered to the patient's satisfaction. Pt knows to call clinic if anything is needed before the next clinic visit.      AMANDA CarnesP-C  Hematology & Oncology  [address] [place] [address]  ph. [phone]  Fax. [phone]     30 minutes were spent in coordination of patient's care, record review and counseling. More than 50% of the time was face-to-face.                Distress Screening Results: Psychosocial Distress screening score of Distress Score: 0 noted and reviewed. No intervention indicated.

## 2018-04-05 ENCOUNTER — TELEPHONE (OUTPATIENT)
Dept: FAMILY MEDICINE | Facility: CLINIC | Age: 63
End: 2018-04-05

## 2018-04-05 NOTE — TELEPHONE ENCOUNTER
Spoke with pt and explained that Leta does not have appointments after 4pm, but that Emily is in the office on Saturday. She will call back re transportation.

## 2018-04-05 NOTE — TELEPHONE ENCOUNTER
----- Message from Beryl Clements sent at 4/5/2018  4:10 PM CDT -----  Contact: self [phone]  She is calling back to say that she can take the appt on Friday, 4/13 in the morning.  Please call her.  Thank you!

## 2018-04-05 NOTE — TELEPHONE ENCOUNTER
----- Message from Ashok Mcknight sent at 4/5/2018  3:47 PM CDT -----  Contact: Mandi  Calling to get an appointment Monday after 4 pm she is having an issue with her asthma. she also said her prescription is at the wrong place. Please call [phone] (home)   Rxergocalciferol (ERGOCALCIFEROL) 50,000 unit [facility]    [facility]  [address].  thanks

## 2018-04-13 ENCOUNTER — OFFICE VISIT (OUTPATIENT)
Dept: FAMILY MEDICINE | Facility: CLINIC | Age: 63
End: 2018-04-13
Payer: MEDICAID

## 2018-04-13 VITALS
BODY MASS INDEX: 29.17 KG/M2 | HEIGHT: 65 IN | SYSTOLIC BLOOD PRESSURE: 124 MMHG | HEART RATE: 76 BPM | RESPIRATION RATE: 20 BRPM | WEIGHT: 175.06 LBS | DIASTOLIC BLOOD PRESSURE: 78 MMHG | TEMPERATURE: 98 F

## 2018-04-13 DIAGNOSIS — J45.20 MILD INTERMITTENT ASTHMA WITHOUT COMPLICATION: Primary | ICD-10-CM

## 2018-04-13 PROCEDURE — 99214 OFFICE O/P EST MOD 30 MIN: CPT | Mod: S$GLB,,, | Performed by: NURSE PRACTITIONER

## 2018-04-13 RX ORDER — ALBUTEROL SULFATE 90 UG/1
AEROSOL, METERED RESPIRATORY (INHALATION)
Refills: 0 | COMMUNITY
Start: 2018-04-01 | End: 2019-09-04

## 2018-04-13 RX ORDER — MONTELUKAST SODIUM 10 MG/1
10 TABLET ORAL NIGHTLY
Qty: 30 TABLET | Refills: 11 | Status: SHIPPED | OUTPATIENT
Start: 2018-04-13 | End: 2020-02-26 | Stop reason: SDUPTHER

## 2018-04-13 RX ORDER — PREDNISONE 20 MG/1
TABLET ORAL
Qty: 18 TABLET | Refills: 0 | Status: SHIPPED | OUTPATIENT
Start: 2018-04-13 | End: 2019-06-17 | Stop reason: ALTCHOICE

## 2018-04-13 NOTE — PROGRESS NOTES
"Subjective:       Patient ID: Mandi Cline is a 62 y.o. female.    Chief Complaint: Asthma    The patient is here to discuss asthma.  She has a history of asthma but this is a new problem for her and she has not had problems with her asthma in about 5 years.  Pt went to ER 4/1/18 with asthma attack. The patient tells me they gave her about 5 days of steroids and albuterol inhaler.  She still feels like she is wheezing at night.  No fever or chills.  She states she has not had asthma problems in many years.  She denies any shortness of breath.      Review of Systems   Constitutional: Negative for activity change and appetite change.   HENT: Negative for congestion, postnasal drip, rhinorrhea and sinus pressure.    Eyes: Negative for pain and redness.   Respiratory: Positive for cough, chest tightness and wheezing. Negative for choking.    Gastrointestinal: Negative for abdominal distention, abdominal pain, blood in stool, constipation, diarrhea, nausea and vomiting.   Endocrine: Negative for polydipsia and polyphagia.   Genitourinary: Negative for dysuria and hematuria.   Musculoskeletal: Negative for arthralgias and myalgias.   Skin: Negative for color change and rash.   Neurological: Negative for dizziness and headaches.   Psychiatric/Behavioral: Negative for agitation and behavioral problems.       Objective:       Vitals:    04/13/18 1111   BP: 124/78   Pulse: 76   Resp: 20   Temp: 98.1 °F (36.7 °C)   TempSrc: Oral   Weight: 79.4 kg (175 lb 0.7 oz)   Height: 5' 5" (1.651 m)   PainSc: 0-No pain       Physical Exam   Constitutional: She is oriented to person, place, and time. She appears well-developed and well-nourished.   HENT:   Head: Normocephalic and atraumatic.   Right Ear: External ear normal.   Left Ear: External ear normal.   Nose: Nose normal.   Mouth/Throat: Oropharynx is clear and moist.   Eyes: Conjunctivae are normal. Right eye exhibits no discharge. Left eye exhibits no discharge. No " scleral icterus.   Neck: Normal range of motion. Neck supple. No tracheal deviation present.   Cardiovascular: Normal rate, regular rhythm and normal heart sounds.  Exam reveals no friction rub.    No murmur heard.  Pulmonary/Chest: Effort normal. No stridor. No respiratory distress. She has wheezes. She has no rales. She exhibits no tenderness.   Mild expiratory wheezing noted throughout lung fields.  Pulse ox 98%.     Musculoskeletal: Normal range of motion.   Lymphadenopathy:     She has no cervical adenopathy.   Neurological: She is alert and oriented to person, place, and time.   Skin: Skin is warm and dry.   Psychiatric: She has a normal mood and affect.       Assessment:       1. Mild intermittent asthma without complication        Plan:       Mandi was seen today for asthma.    Diagnoses and all orders for this visit:    Mild intermittent asthma without complication  -     predniSONE (DELTASONE) 20 MG tablet; Take 3 tablets daily for 3 days, then 2 tablets daily for 3 days, then 1 tablet daily for 3 days.  -     montelukast (SINGULAIR) 10 mg tablet; Take 1 tablet (10 mg total) by mouth every evening.    The patient has a follow-up with me in 2 months and we will reevaluate asthma at that time.  She will notify me sooner if she has any worsening of symptoms.

## 2018-06-20 ENCOUNTER — TELEPHONE (OUTPATIENT)
Dept: FAMILY MEDICINE | Facility: CLINIC | Age: 63
End: 2018-06-20

## 2018-06-20 ENCOUNTER — OFFICE VISIT (OUTPATIENT)
Dept: FAMILY MEDICINE | Facility: CLINIC | Age: 63
End: 2018-06-20
Payer: MEDICAID

## 2018-06-20 VITALS
BODY MASS INDEX: 28.87 KG/M2 | DIASTOLIC BLOOD PRESSURE: 74 MMHG | WEIGHT: 173.31 LBS | TEMPERATURE: 98 F | HEART RATE: 76 BPM | RESPIRATION RATE: 20 BRPM | SYSTOLIC BLOOD PRESSURE: 128 MMHG | HEIGHT: 65 IN

## 2018-06-20 DIAGNOSIS — R73.03 PREDIABETES: ICD-10-CM

## 2018-06-20 DIAGNOSIS — M79.89 LEG SWELLING: Primary | ICD-10-CM

## 2018-06-20 DIAGNOSIS — E55.9 VITAMIN D DEFICIENCY: ICD-10-CM

## 2018-06-20 DIAGNOSIS — R00.2 PALPITATIONS: ICD-10-CM

## 2018-06-20 DIAGNOSIS — G89.29 CHRONIC PAIN OF RIGHT KNEE: ICD-10-CM

## 2018-06-20 DIAGNOSIS — M25.561 CHRONIC PAIN OF RIGHT KNEE: ICD-10-CM

## 2018-06-20 PROCEDURE — 99214 OFFICE O/P EST MOD 30 MIN: CPT | Mod: S$GLB,,, | Performed by: NURSE PRACTITIONER

## 2018-06-20 RX ORDER — TRAMADOL HYDROCHLORIDE 50 MG/1
TABLET ORAL
Refills: 0 | COMMUNITY
Start: 2018-04-30 | End: 2018-12-12

## 2018-06-20 NOTE — TELEPHONE ENCOUNTER
Unable to schedule pt Holter. Pt requesting appointment on Monday, July 2nd, if available. Please advise.

## 2018-06-20 NOTE — PROGRESS NOTES
"S Subjective:       Patient ID: Mandi Cline is a 62 y.o. female.    Chief Complaint: Follow-up    The patient is here for a follow-up visit.  She is feeling well today but tells me that she has been having some leg swelling on and off over the past couple months.  She has been trying to limit the amount of salt that she is taking in.  She does tell me she has been feeling some palpitations.  She denies any chest pain. She does report a dry cough.         She is still having significant knee pain and tells me she needs a referral to an outside orthopedic.    She does have a significant vitamin-D deficiency.  She is taking OTC vitamin-D daily.    Patient does have  prediabetes as well with her last hemoglobin A1c at 6.0.      Review of Systems   Constitutional: Negative for appetite change, chills and fever.   HENT: Negative for ear pain and postnasal drip.    Eyes: Negative for pain and itching.   Respiratory: Negative for chest tightness and shortness of breath.    Gastrointestinal: Negative for abdominal distention and abdominal pain.   Endocrine: Negative for polydipsia and polyuria.   Genitourinary: Negative for difficulty urinating and flank pain.   Skin: Negative for color change and pallor.   Neurological: Negative for light-headedness and headaches.   Hematological: Negative for adenopathy. Does not bruise/bleed easily.   Psychiatric/Behavioral: Negative for agitation.       Objective:       Vitals:    06/20/18 0941   BP: 128/74   Pulse: 76   Resp: 20   Temp: 98.1 °F (36.7 °C)   TempSrc: Oral   Weight: 78.6 kg (173 lb 4.5 oz)   Height: 5' 5" (1.651 m)   PainSc:   8   PainLoc: Knee       Physical Exam   Constitutional: She is oriented to person, place, and time. She appears well-developed and well-nourished.   HENT:   Head: Normocephalic and atraumatic.   Right Ear: External ear normal.   Left Ear: External ear normal.   Nose: Nose normal.   Mouth/Throat: Oropharynx is clear and moist.   Eyes: " Conjunctivae are normal. Right eye exhibits no discharge. Left eye exhibits no discharge. No scleral icterus.   Neck: Normal range of motion. Neck supple. No tracheal deviation present.   Cardiovascular: Normal rate, regular rhythm and normal heart sounds.  Exam reveals no friction rub.    No murmur heard.  2+ pitting edema to the lower extremities.   Pulmonary/Chest: Effort normal and breath sounds normal. No stridor. No respiratory distress. She has no wheezes. She has no rales. She exhibits no tenderness.   Musculoskeletal: Normal range of motion.   Lymphadenopathy:     She has no cervical adenopathy.   Neurological: She is alert and oriented to person, place, and time.   Skin: Skin is warm and dry.   Psychiatric: She has a normal mood and affect.       Assessment:       1. Leg swelling    2. Prediabetes    3. Chronic pain of right knee    4. Vitamin D deficiency    5. Palpitations        Plan:       Mandi was seen today for follow-up.    Diagnoses and all orders for this visit:    Leg swelling  -     Comprehensive metabolic panel; Future  -     Brain natriuretic peptide; Future  -     Transthoracic echo (TTE) complete ([facility], [facility], [facility], [facility]); Future  -     SCHEDULED EKG 12-LEAD (to [facility]); Future    Prediabetes  -     Hemoglobin A1c; Future    Chronic pain of right knee  -     Ambulatory consult to Orthopedics    Vitamin D deficiency  -     Vitamin D; Future    Palpitations  -     Cancel: Holter monitor - 48 hour; Future  -     Holter monitor - 48 hour (CUPID ONLY-[facility], [facility], [facility], [place], [place]); Future

## 2018-06-21 RX ORDER — PANTOPRAZOLE SODIUM 40 MG/1
40 TABLET, DELAYED RELEASE ORAL DAILY
Qty: 30 TABLET | Refills: 11 | Status: SHIPPED | OUTPATIENT
Start: 2018-06-21 | End: 2019-09-05 | Stop reason: SDUPTHER

## 2018-07-02 ENCOUNTER — CLINICAL SUPPORT (OUTPATIENT)
Dept: CARDIOLOGY | Facility: CLINIC | Age: 63
End: 2018-07-02
Payer: MEDICAID

## 2018-07-02 ENCOUNTER — CLINICAL SUPPORT (OUTPATIENT)
Dept: CARDIOLOGY | Facility: CLINIC | Age: 63
End: 2018-07-02
Attending: NURSE PRACTITIONER
Payer: MEDICAID

## 2018-07-02 ENCOUNTER — LAB VISIT (OUTPATIENT)
Dept: LAB | Facility: HOSPITAL | Age: 63
End: 2018-07-02
Attending: NURSE PRACTITIONER
Payer: MEDICAID

## 2018-07-02 DIAGNOSIS — E55.9 VITAMIN D DEFICIENCY: ICD-10-CM

## 2018-07-02 DIAGNOSIS — M79.89 LEG SWELLING: ICD-10-CM

## 2018-07-02 DIAGNOSIS — R00.2 PALPITATIONS: ICD-10-CM

## 2018-07-02 DIAGNOSIS — R73.03 PREDIABETES: ICD-10-CM

## 2018-07-02 LAB
25(OH)D3+25(OH)D2 SERPL-MCNC: 24 NG/ML
ALBUMIN SERPL BCP-MCNC: 3.8 G/DL
ALP SERPL-CCNC: 72 U/L
ALT SERPL W/O P-5'-P-CCNC: 14 U/L
ANION GAP SERPL CALC-SCNC: 8 MMOL/L
AST SERPL-CCNC: 15 U/L
BILIRUB SERPL-MCNC: 0.6 MG/DL
BNP SERPL-MCNC: 33 PG/ML
BUN SERPL-MCNC: 17 MG/DL
CALCIUM SERPL-MCNC: 9.8 MG/DL
CHLORIDE SERPL-SCNC: 102 MMOL/L
CO2 SERPL-SCNC: 28 MMOL/L
CREAT SERPL-MCNC: 0.9 MG/DL
EST. GFR  (AFRICAN AMERICAN): >60 ML/MIN/1.73 M^2
EST. GFR  (NON AFRICAN AMERICAN): >60 ML/MIN/1.73 M^2
ESTIMATED AVG GLUCOSE: 134 MG/DL
GLUCOSE SERPL-MCNC: 121 MG/DL
HBA1C MFR BLD HPLC: 6.3 %
POTASSIUM SERPL-SCNC: 3.9 MMOL/L
PROT SERPL-MCNC: 8.1 G/DL
SODIUM SERPL-SCNC: 138 MMOL/L

## 2018-07-02 PROCEDURE — 80053 COMPREHEN METABOLIC PANEL: CPT

## 2018-07-02 PROCEDURE — 82306 VITAMIN D 25 HYDROXY: CPT

## 2018-07-02 PROCEDURE — 93226 XTRNL ECG REC<48 HR SCAN A/R: CPT | Mod: PBBFAC,PO | Performed by: INTERNAL MEDICINE

## 2018-07-02 PROCEDURE — 83036 HEMOGLOBIN GLYCOSYLATED A1C: CPT

## 2018-07-02 PROCEDURE — 93010 ELECTROCARDIOGRAM REPORT: CPT | Mod: S$PBB,,, | Performed by: INTERNAL MEDICINE

## 2018-07-02 PROCEDURE — 36415 COLL VENOUS BLD VENIPUNCTURE: CPT | Mod: PO

## 2018-07-02 PROCEDURE — 93005 ELECTROCARDIOGRAM TRACING: CPT | Mod: PBBFAC,PO | Performed by: INTERNAL MEDICINE

## 2018-07-02 PROCEDURE — 93227 XTRNL ECG REC<48 HR R&I: CPT | Mod: S$PBB,,, | Performed by: INTERNAL MEDICINE

## 2018-07-02 PROCEDURE — 83880 ASSAY OF NATRIURETIC PEPTIDE: CPT

## 2018-07-12 LAB
OHS CV HOLTER LENGTH DECIMAL HOURS: 47.98
OHS CV HOLTER LENGTH HOURS: 47
OHS CV HOLTER LENGTH MINUTES: 59

## 2018-07-18 ENCOUNTER — TELEPHONE (OUTPATIENT)
Dept: FAMILY MEDICINE | Facility: CLINIC | Age: 63
End: 2018-07-18

## 2018-07-18 NOTE — TELEPHONE ENCOUNTER
----- Message from Caro Leal sent at 7/18/2018  4:08 PM CDT -----  Type: Needs Medical Advice    Who Called:  patient  Symptoms (please be specific):  Na  How long has patient had these symptoms:  Rachel  Pharmacy name and phone #:  [facility]  Best Call Back Number: [phone] (home) [phone] (work)    Additional Information: Would like to discuss orthopedic referral, advised at previous appt. Also need nurse/assistant that speaks Pashto to call back

## 2018-07-19 NOTE — TELEPHONE ENCOUNTER
Contacted language line, call was placed to pt using  , no answer.  did leave message to have pt return call.--lp

## 2018-07-26 NOTE — TELEPHONE ENCOUNTER
called hm and cell # in chart , person that answered the phone stated we had wrong # .  called the work # , no answer , voicemail was left to call clinic.--lp

## 2018-08-20 ENCOUNTER — OFFICE VISIT (OUTPATIENT)
Dept: FAMILY MEDICINE | Facility: CLINIC | Age: 63
End: 2018-08-20
Payer: MEDICAID

## 2018-08-20 VITALS
SYSTOLIC BLOOD PRESSURE: 108 MMHG | BODY MASS INDEX: 32.79 KG/M2 | TEMPERATURE: 99 F | HEIGHT: 60 IN | RESPIRATION RATE: 18 BRPM | DIASTOLIC BLOOD PRESSURE: 60 MMHG | HEART RATE: 76 BPM | WEIGHT: 167 LBS

## 2018-08-20 DIAGNOSIS — R20.2 NUMBNESS AND TINGLING OF LOWER EXTREMITY: ICD-10-CM

## 2018-08-20 DIAGNOSIS — M79.605 PAIN IN BOTH LOWER EXTREMITIES: Primary | ICD-10-CM

## 2018-08-20 DIAGNOSIS — R20.0 NUMBNESS AND TINGLING OF LOWER EXTREMITY: ICD-10-CM

## 2018-08-20 DIAGNOSIS — R73.03 PREDIABETES: ICD-10-CM

## 2018-08-20 DIAGNOSIS — E66.9 OBESITY (BMI 30-39.9): ICD-10-CM

## 2018-08-20 DIAGNOSIS — E55.9 VITAMIN D DEFICIENCY: ICD-10-CM

## 2018-08-20 DIAGNOSIS — M79.604 PAIN IN BOTH LOWER EXTREMITIES: Primary | ICD-10-CM

## 2018-08-20 PROCEDURE — 99214 OFFICE O/P EST MOD 30 MIN: CPT | Mod: S$GLB,,, | Performed by: NURSE PRACTITIONER

## 2018-08-20 RX ORDER — ERGOCALCIFEROL 1.25 MG/1
50000 CAPSULE ORAL
Qty: 9 CAPSULE | Refills: 0 | Status: SHIPPED | OUTPATIENT
Start: 2018-08-20 | End: 2018-12-19

## 2018-08-21 PROBLEM — E55.9 VITAMIN D DEFICIENCY: Status: ACTIVE | Noted: 2018-08-21

## 2018-09-25 ENCOUNTER — TELEPHONE (OUTPATIENT)
Dept: HEMATOLOGY/ONCOLOGY | Facility: CLINIC | Age: 63
End: 2018-09-25

## 2018-09-25 NOTE — TELEPHONE ENCOUNTER
Patient called in with a  to change her apt on 10/2. Apt changed to 10/24 per patient request. Patient notified apt will be at [facility].

## 2018-10-02 ENCOUNTER — TELEPHONE (OUTPATIENT)
Dept: FAMILY MEDICINE | Facility: CLINIC | Age: 63
End: 2018-10-02

## 2018-10-02 NOTE — TELEPHONE ENCOUNTER
----- Message from Lawrence Nicole sent at 10/2/2018 10:29 AM CDT -----  Contact: same  Patient stated she needs the order for her mammo sent to the [facility] because she needs it done before she sees her oncologist on 10/24/18.    Patient call back number is [phone]

## 2018-10-03 ENCOUNTER — TELEPHONE (OUTPATIENT)
Dept: FAMILY MEDICINE | Facility: CLINIC | Age: 63
End: 2018-10-03

## 2018-10-03 DIAGNOSIS — Z12.39 BREAST CANCER SCREENING: Primary | ICD-10-CM

## 2018-10-03 NOTE — TELEPHONE ENCOUNTER
Routine or diagnostic mammogram?   Pt states she needs one before she sees her oncologist at [facility].

## 2018-10-08 ENCOUNTER — TELEPHONE (OUTPATIENT)
Dept: FAMILY MEDICINE | Facility: CLINIC | Age: 63
End: 2018-10-08

## 2018-10-08 NOTE — TELEPHONE ENCOUNTER
States that she needs a bilat US.   Did not see it.  The mammogram orders are already in the system.

## 2018-10-08 NOTE — TELEPHONE ENCOUNTER
I understand that she is staying this but the last mammogram that she had said routine screening in 1 year.  Please contact the [facility] to see what they want ordered

## 2018-10-08 NOTE — TELEPHONE ENCOUNTER
----- Message from Beryl Clements sent at 10/8/2018  9:18 AM CDT -----  Contact: self [phone]  She said she needs orders for a diagnostic mammogram and ultrasounds for both breasts.  She needs it sent to the [facility], phone # [phone].  Please let her know when it has been sent.  Thank you!

## 2018-10-29 ENCOUNTER — TELEPHONE (OUTPATIENT)
Dept: FAMILY MEDICINE | Facility: CLINIC | Age: 63
End: 2018-10-29

## 2018-10-29 NOTE — TELEPHONE ENCOUNTER
Spoke w/ Cheng at [facility] , pt is there for mammogram. However, last mammo report recommends yearly check up ( due in March) . Message was sent by pt requesting a mammo before oncology apppt in November. Leta requested we contact [facility] to get recommendation . Pt was scheduled for routine mammogram but no reason given / no orders by oncologist to have mammo early. [facility] will advise pt that mammo not needed at this time . If pt needs mammo sooner than the recommended 1 yr , pt to get orders from that physician.--lp

## 2018-10-29 NOTE — TELEPHONE ENCOUNTER
Called [facility] Cheng good not available. Left message to call our back line .--lp

## 2018-10-29 NOTE — TELEPHONE ENCOUNTER
----- Message from Enmanuel Izquierdo sent at 10/29/2018  3:30 PM CDT -----  Contact: Kathy with [facility]   Agabata with [facility] need to speak with a nurse regarding a screening case. Patient is there now, Please call back at [phone]

## 2018-11-14 ENCOUNTER — TELEPHONE (OUTPATIENT)
Dept: HEMATOLOGY/ONCOLOGY | Facility: CLINIC | Age: 63
End: 2018-11-14

## 2018-11-14 NOTE — TELEPHONE ENCOUNTER
Contacted patient to discuss her appointment this morning scheduled at the [facility] location at 1130. Pt Telugu  Speaking so used three way call via [employer] department. Patient stated that her transportation that is provided through her insurance is often late or no shows and patient is missing appointments. Patient needed rescheduled today, because her transportation has yet to get her, and patient unlikely to make 1130 appointment. Patient rescheduled to next month 12/12 at 1100. Reached out to SHEELA castelan  to reach out to patient and assist with future transportation arrangements. New appointment slip to be mailed out. Patient expressed gratitude. Mariola informed and to see what she can do to help patient.

## 2018-11-19 ENCOUNTER — DOCUMENTATION ONLY (OUTPATIENT)
Dept: HEMATOLOGY/ONCOLOGY | Facility: CLINIC | Age: 63
End: 2018-11-19

## 2018-11-19 NOTE — PROGRESS NOTES
Contacted the patient with the assistance of international department to translate. Reached out to patient to talk about setting up transportation for her upcoming appointment in December. She is stating that she has already taken care of getting it set up through Medicaid transportation. She reports she has received a confirmation number for that trip. Patient declines the assistance with transportation at this time. I provided her with my name and direct number if she needs assistance in the future.

## 2018-12-12 ENCOUNTER — OFFICE VISIT (OUTPATIENT)
Dept: HEMATOLOGY/ONCOLOGY | Facility: CLINIC | Age: 63
End: 2018-12-12
Attending: INTERNAL MEDICINE
Payer: MEDICAID

## 2018-12-12 VITALS
SYSTOLIC BLOOD PRESSURE: 116 MMHG | DIASTOLIC BLOOD PRESSURE: 87 MMHG | WEIGHT: 158.31 LBS | OXYGEN SATURATION: 98 % | TEMPERATURE: 99 F | RESPIRATION RATE: 18 BRPM | BODY MASS INDEX: 30.91 KG/M2 | HEART RATE: 73 BPM

## 2018-12-12 DIAGNOSIS — Z85.3 HISTORY OF BREAST CANCER: Primary | ICD-10-CM

## 2018-12-12 PROCEDURE — 99999 PR PBB SHADOW E&M-EST. PATIENT-LVL III: CPT | Mod: PBBFAC,,, | Performed by: INTERNAL MEDICINE

## 2018-12-12 PROCEDURE — 99213 OFFICE O/P EST LOW 20 MIN: CPT | Mod: PBBFAC | Performed by: INTERNAL MEDICINE

## 2018-12-12 PROCEDURE — 99213 OFFICE O/P EST LOW 20 MIN: CPT | Mod: S$PBB,,, | Performed by: INTERNAL MEDICINE

## 2018-12-12 NOTE — PROGRESS NOTES
Subjective:       Patient ID: Mandi Cline is a 63 y.o. female.    Chief Complaint: No chief complaint on file.    HPI   Ms. Zacarias returns today for follow up.     Briefly, she is a 63-year-old  female, originally from [place] who currently lives in [place], [place].  Her history dates back to August 2015 when a routine screening mammogram showed calcifications in the superior part of the left breast.  Core biopsies of 2 lesions showed high-grade DCIS.  The cells were strongly ER positive, but only 10% were mildly IA positive.  The patient was referred to Dr. Kincaid and underwent initial wire localization lumpectomy on 09/25/2015.    Pathology revealed extensive solid high-grade DCIS measuring 4 cm in diameter with extensive comedonecrosis.  Due to extremely close margins, she underwent a reexcision on 10/20/2015.  Tissue from the anterior and superior margins revealed benign breast tissue, but there was a focus of residual solid high-grade DCIS on the posterior margin, measuring 1.1 cm.  She returned to the OR on 11/12/2015 for further reexcision of the posterior margin.  Pathology revealed no residual DCIS but again a close margin at 0.6 mm..      The patient was seen by Dr. Horan and in early 2016, she underwent radiation therapy to the breast.  She completed her radiation in March, and she was referred for evaluation. I had recommended against adjuvant hormonal therapy due to prior history of DVTs x 2.    A mammogram last March was read as BIRADS III and a 12 month follow up was recommended.      Review of Systems    Overall she feels well.  She denies any depression, easy bruising, fevers, chills, night  sweats, weight loss, nausea, vomiting, diarrhea, constipation, diplopia, blurred vision, headache, chest pain, palpitations, shortness of breath, breast pain, abdominal pain, extremity pain, or difficulty ambulating.  The remainder of the ten-point ROS, including general,  skin, lymph, H/N, cardiorespiratory, GI, , Neuro, Endocrine, and psychiatric is negative. ECOG PS remains 0.    Objective:      Physical Exam    She is alert, oriented to time, place, person, pleasant, well      nourished, in no acute physical distress.                                    VITAL SIGNS:  Reviewed                                      HEENT:  Normal.  There are no nasal, oral, lip, gingival, auricular, lid,    or conjunctival lesions.  Mucosae are moist and pink, and there is no        thrush.  Pupils are equal, reactive to light and accommodation.              Extraocular muscle movements are intact.    Dentition is good.  There is no frontal or maxillary tendernress                                   NECK:  Supple without JVD, adenopathy, or thyromegaly.                       LUNGS:  Clear to auscultation without wheezing, rales, or rhonchi.           CARDIOVASCULAR:  Reveals an S1, S2, no murmurs, no rubs, no gallops.         ABDOMEN:  Soft, nontender, without organomegaly.  Bowel sounds are    present.                                                                     EXTREMITIES:  No cyanosis, clubbing, or edema.                               BREASTS:  She is status post left lumpectomy with a well-healed incision.  There is mild hyperpigmentation within the radiation field  There are no masses in either breast.  There is a small mole at the 8 o' clock position in the right breast.                                      LYMPHATIC:  There is no cervical, axillary, or supraclavicular adenopathy.   SKIN:  Warm and moist, without petechiae, rashes, induration, or ecchymoses.           NEUROLOGIC:  DTRs are 0-1+ bilaterally, symmetrical, motor function is 5/5,  and cranial nerves are  within normal limits.      Assessment:       1. DCIS (ductal carcinoma in situ) of breast, left, s/p lumpectomy and XRT   2 Skin lesion, right breast           Plan:     From the DCIS perspective,  Mrs Zacarias is doing well.   She will see me again in late March with a mammogram    Her questions were answered to her satisfaction.

## 2018-12-19 ENCOUNTER — OFFICE VISIT (OUTPATIENT)
Dept: FAMILY MEDICINE | Facility: CLINIC | Age: 63
End: 2018-12-19
Payer: MEDICAID

## 2018-12-19 VITALS
SYSTOLIC BLOOD PRESSURE: 114 MMHG | WEIGHT: 157.44 LBS | RESPIRATION RATE: 16 BRPM | BODY MASS INDEX: 30.91 KG/M2 | TEMPERATURE: 98 F | HEIGHT: 60 IN | OXYGEN SATURATION: 98 % | DIASTOLIC BLOOD PRESSURE: 66 MMHG | HEART RATE: 69 BPM

## 2018-12-19 DIAGNOSIS — E66.9 OBESITY, UNSPECIFIED CLASSIFICATION, UNSPECIFIED OBESITY TYPE, UNSPECIFIED WHETHER SERIOUS COMORBIDITY PRESENT: ICD-10-CM

## 2018-12-19 DIAGNOSIS — R73.03 PRE-DIABETES: ICD-10-CM

## 2018-12-19 DIAGNOSIS — L84 CALLUS OF FOOT: ICD-10-CM

## 2018-12-19 DIAGNOSIS — E55.9 VITAMIN D DEFICIENCY: Primary | ICD-10-CM

## 2018-12-19 LAB
25(OH)D3+25(OH)D2 SERPL-MCNC: 29 NG/ML
ALBUMIN SERPL BCP-MCNC: 3.9 G/DL
ALP SERPL-CCNC: 46 U/L
ALT SERPL W/O P-5'-P-CCNC: 14 U/L
ANION GAP SERPL CALC-SCNC: 8 MMOL/L
AST SERPL-CCNC: 19 U/L
BILIRUB SERPL-MCNC: 0.4 MG/DL
BUN SERPL-MCNC: 24 MG/DL
CALCIUM SERPL-MCNC: 9.9 MG/DL
CHLORIDE SERPL-SCNC: 103 MMOL/L
CO2 SERPL-SCNC: 26 MMOL/L
CREAT SERPL-MCNC: 0.8 MG/DL
EST. GFR  (AFRICAN AMERICAN): >60 ML/MIN/1.73 M^2
EST. GFR  (NON AFRICAN AMERICAN): >60 ML/MIN/1.73 M^2
ESTIMATED AVG GLUCOSE: 111 MG/DL
GLUCOSE SERPL-MCNC: 102 MG/DL
HBA1C MFR BLD HPLC: 5.5 %
POTASSIUM SERPL-SCNC: 4.1 MMOL/L
PROT SERPL-MCNC: 7.7 G/DL
SODIUM SERPL-SCNC: 137 MMOL/L

## 2018-12-19 PROCEDURE — 90471 IMMUNIZATION ADMIN: CPT | Mod: S$GLB,,, | Performed by: NURSE PRACTITIONER

## 2018-12-19 PROCEDURE — 82306 VITAMIN D 25 HYDROXY: CPT

## 2018-12-19 PROCEDURE — 83036 HEMOGLOBIN GLYCOSYLATED A1C: CPT

## 2018-12-19 PROCEDURE — 90662 IIV NO PRSV INCREASED AG IM: CPT | Mod: S$GLB,,, | Performed by: NURSE PRACTITIONER

## 2018-12-19 PROCEDURE — 80053 COMPREHEN METABOLIC PANEL: CPT

## 2018-12-19 PROCEDURE — 99214 OFFICE O/P EST MOD 30 MIN: CPT | Mod: 25,S$GLB,, | Performed by: NURSE PRACTITIONER

## 2018-12-23 NOTE — PROGRESS NOTES
Subjective:       Patient ID: Mandi Cline is a 63 y.o. female.    Chief Complaint: Follow-up (4 month follow up)    The patient is here for a follow-up visit.  She is generally feeling well today without any new complaints.  She has lost 30 lb since our last visit.  She has the following active problems.  1. Prediabetes-Lab Results       Component                Value               Date                       HGBA1C                   5.5                 12/19/2018            2.  Vitamin-D deficiency-vitamin-D 29.  She is not currently taking her vitamin-D replacement consistently.  3. Foot callus-she wants to see a podiatrist as she has painful areas. She is always on her feet.      Review of Systems   Constitutional: Negative for activity change and appetite change.   HENT: Negative for congestion, postnasal drip, rhinorrhea and sinus pressure.    Eyes: Negative for pain and redness.   Respiratory: Negative for choking and chest tightness.    Gastrointestinal: Negative for abdominal distention, abdominal pain, blood in stool, constipation, diarrhea, nausea and vomiting.   Endocrine: Negative for polydipsia and polyphagia.   Genitourinary: Negative for dysuria and hematuria.   Musculoskeletal: Negative for arthralgias and myalgias.   Skin: Negative for color change and rash.   Neurological: Negative for dizziness and headaches.   Psychiatric/Behavioral: Negative for agitation and behavioral problems.       Past medical, surgical, family and social history reviewed.  Objective:     Vitals:    12/19/18 1129   BP: 114/66   Pulse: 69   Resp: 16   Temp: 98.1 °F (36.7 °C)   TempSrc: Oral   SpO2: 98%   Weight: 71.4 kg (157 lb 6.5 oz)   Height: 5' (1.524 m)   PainSc:   4   PainLoc: Leg     Body mass index is 30.74 kg/m².     Physical Exam   Constitutional: She is oriented to person, place, and time. She appears well-developed. No distress.   Obese female   HENT:   Head: Normocephalic and atraumatic.   Right  Ear: Hearing, tympanic membrane, external ear and ear canal normal.   Left Ear: Hearing, tympanic membrane, external ear and ear canal normal.   Nose: Nose normal.   Mouth/Throat: Uvula is midline, oropharynx is clear and moist and mucous membranes are normal.   Eyes: Conjunctivae and EOM are normal. Pupils are equal, round, and reactive to light. Right eye exhibits no discharge. Left eye exhibits no discharge.   Neck: Trachea normal and normal range of motion. Neck supple. No JVD present. Carotid bruit is not present. No thyromegaly present.   Cardiovascular: Normal rate and regular rhythm. Exam reveals no gallop and no friction rub.   No murmur heard.  Pulmonary/Chest: Effort normal and breath sounds normal. No respiratory distress. She has no wheezes. She has no rales. She exhibits no tenderness.   Abdominal: Soft. Bowel sounds are normal. She exhibits no distension and no mass. There is no tenderness. There is no rebound and no guarding.   Musculoskeletal: Normal range of motion.   Neurological: She is alert and oriented to person, place, and time. Coordination normal.   Skin: Skin is warm and dry. She is not diaphoretic.   Psychiatric: She has a normal mood and affect. Her behavior is normal. Judgment and thought content normal.       Assessment:       1. Vitamin D deficiency    2. Pre-diabetes    3. Callus of foot    4. Obesity, unspecified classification, unspecified obesity type, unspecified whether serious comorbidity present        Plan:       Mandi was seen today for follow-up.    Diagnoses and all orders for this visit:    Vitamin D deficiency  -     Vitamin D  -     DXA Bone Density Spine And Hip; Future    Pre-diabetes  -     Comprehensive metabolic panel  -     Hemoglobin A1c    Callus of foot  -     Ambulatory consult to Podiatry    Obesity, unspecified classification, unspecified obesity type, unspecified whether serious comorbidity present  Continue weight loss    Other orders  -     Influenza - High  Dose (65+) (PF) (IM)

## 2019-01-07 ENCOUNTER — OFFICE VISIT (OUTPATIENT)
Dept: PODIATRY | Facility: CLINIC | Age: 64
End: 2019-01-07
Payer: MEDICAID

## 2019-01-07 ENCOUNTER — TELEPHONE (OUTPATIENT)
Dept: FAMILY MEDICINE | Facility: CLINIC | Age: 64
End: 2019-01-07

## 2019-01-07 ENCOUNTER — HOSPITAL ENCOUNTER (OUTPATIENT)
Dept: RADIOLOGY | Facility: HOSPITAL | Age: 64
Discharge: HOME | End: 2019-01-07
Attending: NURSE PRACTITIONER
Payer: MEDICAID

## 2019-01-07 VITALS
DIASTOLIC BLOOD PRESSURE: 65 MMHG | HEART RATE: 66 BPM | WEIGHT: 157.44 LBS | SYSTOLIC BLOOD PRESSURE: 121 MMHG | BODY MASS INDEX: 30.91 KG/M2 | HEIGHT: 60 IN

## 2019-01-07 DIAGNOSIS — M79.604 PAIN IN BOTH LOWER EXTREMITIES: ICD-10-CM

## 2019-01-07 DIAGNOSIS — L84 CORN OR CALLUS: ICD-10-CM

## 2019-01-07 DIAGNOSIS — M20.42 HAMMER TOES OF BOTH FEET: Primary | ICD-10-CM

## 2019-01-07 DIAGNOSIS — I87.2 VENOUS REFLUX: Primary | ICD-10-CM

## 2019-01-07 DIAGNOSIS — L60.0 ONYCHOCRYPTOSIS: ICD-10-CM

## 2019-01-07 DIAGNOSIS — M79.605 PAIN IN BOTH LOWER EXTREMITIES: ICD-10-CM

## 2019-01-07 DIAGNOSIS — M20.41 HAMMER TOES OF BOTH FEET: Primary | ICD-10-CM

## 2019-01-07 DIAGNOSIS — E55.9 VITAMIN D DEFICIENCY: ICD-10-CM

## 2019-01-07 PROCEDURE — 93970 EXTREMITY STUDY: CPT | Mod: 26,,, | Performed by: RADIOLOGY

## 2019-01-07 PROCEDURE — 99202 OFFICE O/P NEW SF 15 MIN: CPT | Mod: S$PBB,,, | Performed by: PODIATRIST

## 2019-01-07 PROCEDURE — 99213 OFFICE O/P EST LOW 20 MIN: CPT | Mod: PBBFAC,25,PN | Performed by: PODIATRIST

## 2019-01-07 PROCEDURE — 99999 PR PBB SHADOW E&M-EST. PATIENT-LVL III: CPT | Mod: PBBFAC,,, | Performed by: PODIATRIST

## 2019-01-07 PROCEDURE — 93970 EXTREMITY STUDY: CPT | Mod: TC,PO

## 2019-01-07 PROCEDURE — 77080 DEXA BONE DENSITY SPINE HIP: ICD-10-PCS | Mod: 26,,, | Performed by: RADIOLOGY

## 2019-01-07 PROCEDURE — 99999 PR PBB SHADOW E&M-EST. PATIENT-LVL III: ICD-10-PCS | Mod: PBBFAC,,, | Performed by: PODIATRIST

## 2019-01-07 PROCEDURE — 77080 DXA BONE DENSITY AXIAL: CPT | Mod: 26,,, | Performed by: RADIOLOGY

## 2019-01-07 PROCEDURE — 93970 US LOWER EXTREMITY VEINS BILATERAL INSUFFICIENCY: ICD-10-PCS | Mod: 26,,, | Performed by: RADIOLOGY

## 2019-01-07 PROCEDURE — 99202 PR OFFICE/OUTPT VISIT, NEW, LEVL II, 15-29 MIN: ICD-10-PCS | Mod: S$PBB,,, | Performed by: PODIATRIST

## 2019-01-07 PROCEDURE — 77080 DXA BONE DENSITY AXIAL: CPT | Mod: TC,PO

## 2019-01-07 NOTE — TELEPHONE ENCOUNTER
THE ULTRASOUND OF HER LEG DOES SHOW SIGNIFICANT VENOUS REFLUX.  I WOULD LIKE TO BOOK HER WITH Dr Wiseman.      Her DEXA scan does show osteopenia but this is not bad enough to warrant medication.  She should take OTC calcium with vitamin D--typically this is 1200 mg of calcium with 800 IU of vitamin-D

## 2019-01-08 NOTE — TELEPHONE ENCOUNTER
----- Message from Clary King sent at 1/8/2019  3:24 PM CST -----  Contact: Self   Pt missed a phone call from the nurse and would like a call back at her earliest convenience         Pt can be contacted at [phone]

## 2019-01-10 NOTE — PROGRESS NOTES
Subjective:      Patient ID: Mandi Cline is a 63 y.o. female.    Chief Complaint: Callouses (PCP-Juan Luis,NP-12/19/2018)    Mandi is a 63 y.o. female who presents to the podiatry clinic  with complaint of  bilateral foot pain associated with plantar calluses and toe pain to the right fourth toe. Onset of the symptoms was several months ago. Precipitating event: none known. Current symptoms include: ability to bear weight, but with some pain and worsening symptoms after a period of activity. Aggravating factors: certain shoe wear. Symptoms have gradually worsened.  Evaluation to date: none. Treatment to date: none. Patients rates pain 8/10 on pain scale. Visit was done with a phone  assistance.     Review of Systems   Constitution: Negative for chills and fever.   Cardiovascular: Negative for claudication and leg swelling.   Respiratory: Negative for shortness of breath.    Skin: Positive for nail changes and suspicious lesions. Negative for itching and rash.   Musculoskeletal: Negative for muscle cramps, muscle weakness and myalgias.   Gastrointestinal: Negative for nausea and vomiting.   Neurological: Negative for focal weakness, loss of balance, numbness and paresthesias.           Objective:      Physical Exam   Constitutional: She is oriented to person, place, and time. She appears well-developed and well-nourished. No distress.   Cardiovascular:   Pulses:       Dorsalis pedis pulses are 2+ on the right side, and 2+ on the left side.        Posterior tibial pulses are 2+ on the right side, and 2+ on the left side.   < 3 sec capillary refill time to toes 1-5 bilateral. Toes and feet are warm to touch proximally with normal distal cooling b/l. There is some hair growth on the feet and toes b/l. There is no edema b/l. No spider veins or varicosities present b/l.      Musculoskeletal:   Adductovarus fifth toe rotations bilateral    Equinus noted b/l ankles with < 10 deg DF noted. MMT 5/5 in  DF/PF/Inv/Ev resistance with no reproduction of pain in any direction. Passive range of motion of ankle and pedal joints is painless b/l.     Neurological: She is alert and oriented to person, place, and time. She has normal strength. She displays no atrophy and no tremor. No sensory deficit. She exhibits normal muscle tone.   Negative tinel sign bilateral.   Skin: Skin is warm, dry and intact. Lesion noted. No abrasion, no bruising, no burn, no ecchymosis, no laceration, no petechiae and no rash noted. She is not diaphoretic. No cyanosis or erythema. No pallor. Nails show no clubbing.   Skin temperature, texture and turgor within normal limits.    There are several hyperkeratotic lesions to the plantar forefoot bilateral    Bilateral fifth toes the nails are rubbing into and ingrown to the lateral borders causing callus formation and pain to pressure.   Psychiatric: She has a normal mood and affect. Her behavior is normal.             Assessment:       Encounter Diagnoses   Name Primary?    Hammer toes of both feet Yes    Corn or callus     Onychocryptosis          Plan:       Mandi was seen today for [facility].    Diagnoses and all orders for this visit:    Hammer toes of both feet    Corn or callus    Onychocryptosis      I counseled the patient on her conditions, their implications and medical management.    With patient's verbal consent out of courtesy today the hyperkeratotic lesions bilateral foot were debrided to healthy appearing skin using a # 15 scalpel. Patient relates relief of pain following the procedure. Patient tolerated the procedure well without complications. No anesthesia or hemostasis required. No blood loss.    Patient will obtain over the counter arch supports and wear them in shoes whenever possible.  Athletic shoes intended for walking or running are usually best.    Discussed that the pain surrounding the fifth toes with the nail and callus can be treated through removing the nail  permanently. She agrees to this plan and would like to return to have this done.    Return for bilateral fifth nail avulsions with phenol matrixectomy.    Zach Burch DPM

## 2019-01-21 ENCOUNTER — OFFICE VISIT (OUTPATIENT)
Dept: PODIATRY | Facility: CLINIC | Age: 64
End: 2019-01-21
Payer: COMMERCIAL

## 2019-01-21 VITALS
WEIGHT: 157.44 LBS | HEIGHT: 60 IN | BODY MASS INDEX: 30.91 KG/M2 | DIASTOLIC BLOOD PRESSURE: 71 MMHG | HEART RATE: 74 BPM | SYSTOLIC BLOOD PRESSURE: 115 MMHG

## 2019-01-21 DIAGNOSIS — B35.1 PAIN DUE TO ONYCHOMYCOSIS OF NAIL: Primary | ICD-10-CM

## 2019-01-21 DIAGNOSIS — M79.609 PAIN DUE TO ONYCHOMYCOSIS OF NAIL: Primary | ICD-10-CM

## 2019-01-21 PROCEDURE — 99999 PR PBB SHADOW E&M-EST. PATIENT-LVL III: ICD-10-PCS | Mod: PBBFAC,,, | Performed by: PODIATRIST

## 2019-01-21 PROCEDURE — 11750 EXCISION NAIL&NAIL MATRIX: CPT | Mod: T9,59,PBBFAC,PN | Performed by: PODIATRIST

## 2019-01-21 PROCEDURE — 99499 NO LOS: ICD-10-PCS | Mod: S$GLB,,, | Performed by: PODIATRIST

## 2019-01-21 PROCEDURE — 99499 UNLISTED E&M SERVICE: CPT | Mod: S$GLB,,, | Performed by: PODIATRIST

## 2019-01-21 PROCEDURE — 99999 PR PBB SHADOW E&M-EST. PATIENT-LVL III: CPT | Mod: PBBFAC,,, | Performed by: PODIATRIST

## 2019-01-21 PROCEDURE — 11750 NAIL REMOVAL: ICD-10-PCS | Mod: 51,T4,S$GLB, | Performed by: PODIATRIST

## 2019-01-21 PROCEDURE — 99213 OFFICE O/P EST LOW 20 MIN: CPT | Mod: PBBFAC,PN | Performed by: PODIATRIST

## 2019-01-21 NOTE — LETTER
January 21, 2019    Mandi Cline  [address]  [address]  [place] [id number]         [facility]  [address] [place] [address]  Phone: [phone] January 21, 2019     Patient: Mandi Cline   YOB: 1955   Date of Visit: 1/21/2019       To Whom It May Concern:    It is my medical opinion that Mandi Cline should remain out of work until 1/25/19 secondary to bilateral procedures done on her feet.    If you have any questions or concerns, please don't hesitate to call.    Sincerely,        Zach Burch DPM

## 2019-01-21 NOTE — PROGRESS NOTES
Subjective:      Patient ID: Mandi Cline is a 63 y.o. female.    Chief Complaint: Ingrown Toenail (PT. REFUSED   [facility]aut[facility] 5TH TOE PCP Pamella Heard  12/19/18)    Mandi is a 63 y.o. female who presents to the podiatry clinic  with complaint of  bilateral foot pain associated with plantar calluses and toe pain to the right fourth toe. Onset of the symptoms was several months ago. Precipitating event: none known. Current symptoms include: ability to bear weight, but with some pain and worsening symptoms after a period of activity. Aggravating factors: certain shoe wear. Symptoms have gradually worsened.  Evaluation to date: none. Treatment to date: none. Patients rates pain 8/10 on pain scale. Visit was done with a phone  assistance.     1/21/19: Patient returns for bilateral fifth toe nail removal with phenol matrixectomy as discussed secondary to painful nails and surrounding calluses to the skin on the lateral aspect. Refuses  today.    Review of Systems   Constitution: Negative for chills and fever.   Cardiovascular: Negative for claudication and leg swelling.   Respiratory: Negative for shortness of breath.    Skin: Positive for nail changes and suspicious lesions. Negative for itching and rash.   Musculoskeletal: Negative for muscle cramps, muscle weakness and myalgias.   Gastrointestinal: Negative for nausea and vomiting.   Neurological: Negative for focal weakness, loss of balance, numbness and paresthesias.           Objective:      Physical Exam   Constitutional: She is oriented to person, place, and time. She appears well-developed and well-nourished. No distress.   Cardiovascular:   Pulses:       Dorsalis pedis pulses are 2+ on the right side, and 2+ on the left side.        Posterior tibial pulses are 2+ on the right side, and 2+ on the left side.   < 3 sec capillary refill time to toes 1-5 bilateral. Toes and feet are warm to touch proximally with normal distal  cooling b/l. There is some hair growth on the feet and toes b/l. There is no edema b/l. No spider veins or varicosities present b/l.      Musculoskeletal:   Adductovarus fifth toe rotations bilateral    Equinus noted b/l ankles with < 10 deg DF noted. MMT 5/5 in DF/PF/Inv/Ev resistance with no reproduction of pain in any direction. Passive range of motion of ankle and pedal joints is painless b/l.     Neurological: She is alert and oriented to person, place, and time. She has normal strength. She displays no atrophy and no tremor. No sensory deficit. She exhibits normal muscle tone.   Negative tinel sign bilateral.   Skin: Skin is warm, dry and intact. Lesion noted. No abrasion, no bruising, no burn, no ecchymosis, no laceration, no petechiae and no rash noted. She is not diaphoretic. No cyanosis or erythema. No pallor. Nails show no clubbing.   Skin temperature, texture and turgor within normal limits.    There are several hyperkeratotic lesions to the plantar forefoot bilateral    Bilateral fifth toes the nails are rubbing into and ingrown to the lateral borders causing callus formation and pain to pressure.   Psychiatric: She has a normal mood and affect. Her behavior is normal.             Assessment:       Encounter Diagnosis   Name Primary?    Pain due to onychomycosis of nail Yes         Plan:       Mandi was seen today for ingrown toenail.    Diagnoses and all orders for this visit:    Pain due to onychomycosis of nail  -     Nail Removal  -     Nail Removal      I counseled the patient on her conditions, their implications and medical management.    Discussed that the pain surrounding the fifth toes with the nail and callus can be treated through removing the nail permanently. She agrees to this plan.    Upon reviewing the risks/benefits,  the planned procedure, the surgical goal, and the postoperative course for the bilateral fifth toe nail removal with phenol matrixectomy, the written consent was signed,  timed, and dated by the patient with a witness present.      Written post op instructions were given.    Return in 1 week post op    Zach Burch DPM

## 2019-01-21 NOTE — PATIENT INSTRUCTIONS
"AFTER TOENAIL PROCEDURE INSTRUCTIONS    1. Leave bandage intact until you shower (12-24 hours). If the bandage sticks as you try to remove it, soak it in warm water until it lifts off.    2. Dry foot completely after showering, and apply a small amount of triple antibiotic ointment (Neosporin works fine!) and a fabric or cloth bandaid ("plastic" bandaids tend to lift off with ointment use).  Wear open-toed shoes as needed for comfort.     3. Take Advil or Tylenol as needed for pain.     4. Your toe may drain for the next few days. Normal drainage is yellow-to-pink, and clear, much like the fluid in a blister. Watch for redness spreading up your toe into your foot, white thick drainage (pus), pain unrelieved by medication, or nausea/vomiting/fever/chills. These are signs of infection. Please call the clinic or visit your doctor.      "

## 2019-01-21 NOTE — PROCEDURES
Nail Removal  Date/Time: 1/21/2019 11:33 AM  Performed by: Zach Burch DPM  Authorized by: Zach Burch DPM       Location:  Left foot  Location detail:  Left little toe  Anesthesia:  Digital block  Local anesthetic: lidocaine 1% without epinephrine  Anesthetic total (ml):  3  Preparation:  Skin prepped with alcohol and skin prepped with Betadine    Amount removed:  Complete  Wedge excision of skin of nail fold: No    Nail bed sutured?: No    Nail matrix removed:  Complete  Dressing: neosporin gauze and coban.  Patient tolerance:  Patient tolerated the procedure well with no immediate complications

## 2019-01-21 NOTE — PROCEDURES
Nail Removal  Date/Time: 1/21/2019 11:32 AM  Performed by: Zach Burch DPM  Authorized by: Zach Burch DPM     Consent Done?:  Yes (Written)    Location:  Right foot  Location detail:  Right little toe  Anesthesia:  Digital block  Local anesthetic: lidocaine 1% without epinephrine  Anesthetic total (ml):  3  Preparation:  Skin prepped with alcohol and skin prepped with Betadine    Amount removed:  Complete  Wedge excision of skin of nail fold: No    Nail bed sutured?: No    Nail matrix removed:  Complete  Dressing: neosporin gauze and coban.  Patient tolerance:  Patient tolerated the procedure well with no immediate complications

## 2019-01-23 ENCOUNTER — TELEPHONE (OUTPATIENT)
Dept: PODIATRY | Facility: CLINIC | Age: 64
End: 2019-01-23

## 2019-01-23 NOTE — TELEPHONE ENCOUNTER
Patient was seen on 1/21 for nail removal/phenol.  Requesting a return to work note that stated she has no restrictions.  Please advise.

## 2019-01-23 NOTE — TELEPHONE ENCOUNTER
Patient informed that the letter is ready - she stated that she will pick it up from the registration desk 1st floor by [employer].

## 2019-01-30 ENCOUNTER — OFFICE VISIT (OUTPATIENT)
Dept: PODIATRY | Facility: CLINIC | Age: 64
End: 2019-01-30
Payer: MEDICAID

## 2019-01-30 VITALS
WEIGHT: 157.44 LBS | SYSTOLIC BLOOD PRESSURE: 103 MMHG | DIASTOLIC BLOOD PRESSURE: 65 MMHG | HEART RATE: 68 BPM | BODY MASS INDEX: 30.91 KG/M2 | HEIGHT: 60 IN

## 2019-01-30 DIAGNOSIS — Z98.890 STATUS POST NAIL SURGERY: Primary | ICD-10-CM

## 2019-01-30 PROCEDURE — 99024 POSTOP FOLLOW-UP VISIT: CPT | Mod: ,,, | Performed by: PODIATRIST

## 2019-01-30 PROCEDURE — 99024 PR POST-OP FOLLOW-UP VISIT: ICD-10-PCS | Mod: ,,, | Performed by: PODIATRIST

## 2019-01-30 PROCEDURE — 99999 PR PBB SHADOW E&M-EST. PATIENT-LVL III: CPT | Mod: PBBFAC,,, | Performed by: PODIATRIST

## 2019-01-30 PROCEDURE — 99999 PR PBB SHADOW E&M-EST. PATIENT-LVL III: ICD-10-PCS | Mod: PBBFAC,,, | Performed by: PODIATRIST

## 2019-01-30 PROCEDURE — 99213 OFFICE O/P EST LOW 20 MIN: CPT | Mod: PBBFAC,PN | Performed by: PODIATRIST

## 2019-01-30 NOTE — PROGRESS NOTES
Subjective:      Patient ID: Mandi Cline is a 63 y.o. female.    Chief Complaint: Post-op Evaluation (marco 5th toes - phenol ck) and Other Misc (PCP:  FLACO Heard NP 12/19/18)    Mandi is a 63 y.o. female who presents to the podiatry clinic  with complaint of  bilateral foot pain associated with plantar calluses and toe pain to the right fourth toe. Onset of the symptoms was several months ago. Precipitating event: none known. Current symptoms include: ability to bear weight, but with some pain and worsening symptoms after a period of activity. Aggravating factors: certain shoe wear. Symptoms have gradually worsened.  Evaluation to date: none. Treatment to date: none. Patients rates pain 8/10 on pain scale. Visit was done with a phone  assistance.     1/21/19: Patient returns for bilateral fifth toe nail removal with phenol matrixectomy as discussed secondary to painful nails and surrounding calluses to the skin on the lateral aspect. Refuses  today.    1/30/19: Patient returns for follow up s/p bilateral fifth toe nail removal with phenol matrixectomy. Relates improving pain, doing well without complaint. Not covering with band aid as instructed however.    Review of Systems   Constitution: Negative for chills and fever.   Cardiovascular: Negative for claudication and leg swelling.   Respiratory: Negative for shortness of breath.    Skin: Positive for nail changes and suspicious lesions. Negative for itching and rash.   Musculoskeletal: Negative for muscle cramps, muscle weakness and myalgias.   Gastrointestinal: Negative for nausea and vomiting.   Neurological: Negative for focal weakness, loss of balance, numbness and paresthesias.           Objective:      Physical Exam   Constitutional: She is oriented to person, place, and time. She appears well-developed and well-nourished. No distress.   Cardiovascular:   Pulses:       Dorsalis pedis pulses are 2+ on the right side, and 2+ on  the left side.        Posterior tibial pulses are 2+ on the right side, and 2+ on the left side.   < 3 sec capillary refill time to toes 1-5 bilateral. Toes and feet are warm to touch proximally with normal distal cooling b/l. There is some hair growth on the feet and toes b/l. There is no edema b/l. No spider veins or varicosities present b/l.      Musculoskeletal:   Adductovarus fifth toe rotations bilateral    Equinus noted b/l ankles with < 10 deg DF noted. MMT 5/5 in DF/PF/Inv/Ev resistance with no reproduction of pain in any direction. Passive range of motion of ankle and pedal joints is painless b/l.     Neurological: She is alert and oriented to person, place, and time. She has normal strength. She displays no atrophy and no tremor. No sensory deficit. She exhibits normal muscle tone.   Negative tinel sign bilateral.   Skin: Skin is warm, dry and intact. Lesion noted. No abrasion, no bruising, no burn, no ecchymosis, no laceration, no petechiae and no rash noted. She is not diaphoretic. No cyanosis or erythema. No pallor. Nails show no clubbing.   Skin temperature, texture and turgor within normal limits.    There are several hyperkeratotic lesions to the plantar forefoot bilateral    Bilateral fifth toes the nails are removed with a dry stable eschar covering over the nail bed. No drainage or erythema noted.    Psychiatric: She has a normal mood and affect. Her behavior is normal.             Assessment:       Encounter Diagnosis   Name Primary?    Status post nail surgery Yes         Plan:       Mandi was seen today for post-op evaluation and other misc.    Diagnoses and all orders for this visit:    Status post nail surgery      I counseled the patient on her conditions, their implications and medical management.    Continue covering with neosporin and band aid daily    Return PRN    Discussed if pain persists we may have to fix the hammertoes to the toes causing her to walk on the distal aspect of the  toes.     MELLY PavonM

## 2019-03-20 ENCOUNTER — HOSPITAL ENCOUNTER (OUTPATIENT)
Dept: RADIOLOGY | Facility: HOSPITAL | Age: 64
Discharge: HOME | End: 2019-03-20
Attending: INTERNAL MEDICINE
Payer: MEDICAID

## 2019-03-20 ENCOUNTER — OFFICE VISIT (OUTPATIENT)
Dept: HEMATOLOGY/ONCOLOGY | Facility: CLINIC | Age: 64
End: 2019-03-20
Payer: MEDICAID

## 2019-03-20 VITALS
BODY MASS INDEX: 29.71 KG/M2 | TEMPERATURE: 98 F | RESPIRATION RATE: 20 BRPM | WEIGHT: 152.13 LBS | HEART RATE: 80 BPM | DIASTOLIC BLOOD PRESSURE: 61 MMHG | SYSTOLIC BLOOD PRESSURE: 124 MMHG | OXYGEN SATURATION: 99 %

## 2019-03-20 DIAGNOSIS — Z85.3 HISTORY OF BREAST CANCER: Primary | ICD-10-CM

## 2019-03-20 DIAGNOSIS — Z85.3 HISTORY OF BREAST CANCER: ICD-10-CM

## 2019-03-20 PROCEDURE — 77066 DX MAMMO INCL CAD BI: CPT | Mod: TC,PO

## 2019-03-20 PROCEDURE — 77066 DX MAMMO INCL CAD BI: CPT | Mod: 26,,, | Performed by: RADIOLOGY

## 2019-03-20 PROCEDURE — 99213 OFFICE O/P EST LOW 20 MIN: CPT | Mod: S$PBB,,, | Performed by: NURSE PRACTITIONER

## 2019-03-20 PROCEDURE — 77062 BREAST TOMOSYNTHESIS BI: CPT | Mod: 26,,, | Performed by: RADIOLOGY

## 2019-03-20 PROCEDURE — 77066 MAMMO DIGITAL DIAGNOSTIC BILAT WITH TOMOSYNTHESIS_CAD: ICD-10-PCS | Mod: 26,,, | Performed by: RADIOLOGY

## 2019-03-20 PROCEDURE — 99999 PR PBB SHADOW E&M-EST. PATIENT-LVL III: CPT | Mod: PBBFAC,,, | Performed by: NURSE PRACTITIONER

## 2019-03-20 PROCEDURE — 99999 PR PBB SHADOW E&M-EST. PATIENT-LVL III: ICD-10-PCS | Mod: PBBFAC,,, | Performed by: NURSE PRACTITIONER

## 2019-03-20 PROCEDURE — 77062 MAMMO DIGITAL DIAGNOSTIC BILAT WITH TOMOSYNTHESIS_CAD: ICD-10-PCS | Mod: 26,,, | Performed by: RADIOLOGY

## 2019-03-20 PROCEDURE — 99213 OFFICE O/P EST LOW 20 MIN: CPT | Mod: PBBFAC | Performed by: NURSE PRACTITIONER

## 2019-03-20 PROCEDURE — 99213 PR OFFICE/OUTPT VISIT, EST, LEVL III, 20-29 MIN: ICD-10-PCS | Mod: S$PBB,,, | Performed by: NURSE PRACTITIONER

## 2019-03-20 NOTE — PROGRESS NOTES
Subjective:       Patient ID: Mandi Cline is a 63 y.o. female.    Chief Complaint: Results (mammogram)    HPI   Ms. Zacarias returns today for follow up. She may switch to a provider on the [facility] as she has difficulty with transportation.     Briefly, she is a 63-year-old  female, originally from [place] who currently lives in [place], [place].  Her history dates back to August 2015 when a routine screening mammogram showed calcifications in the superior part of the left breast.  Core biopsies of 2 lesions showed high-grade DCIS.  The cells were strongly ER positive, but only 10% were mildly KS positive.  The patient was referred to Dr. Kincaid and underwent initial wire localization lumpectomy on 09/25/2015.    Pathology revealed extensive solid high-grade DCIS measuring 4 cm in diameter with extensive comedonecrosis.  Due to extremely close margins, she underwent a reexcision on 10/20/2015.  Tissue from the anterior and superior margins revealed benign breast tissue, but there was a focus of residual solid high-grade DCIS on the posterior margin, measuring 1.1 cm.  She returned to the OR on 11/12/2015 for further reexcision of the posterior margin.  Pathology revealed no residual DCIS but again a close margin at 0.6 mm..      The patient was seen by Dr. Horan and in early 2016, she underwent radiation therapy to the breast.  She completed her radiation in March, and she was referred for evaluation. I had recommended against adjuvant hormonal therapy due to prior history of DVTs x 2.    A mammogram earlier today was read as BIRADS II and a 12 month follow up was recommended.      Review of Systems    Overall she feels well.  No complaints today. She denies any depression, easy bruising, fevers, chills, night  sweats, weight loss, nausea, vomiting, diarrhea, constipation, diplopia, blurred vision, headache, chest pain, palpitations, shortness of breath, breast pain, abdominal  pain, extremity pain, or difficulty ambulating.  The remainder of the ten-point ROS, including general, skin, lymph, H/N, cardiorespiratory, GI, , Neuro, Endocrine, and psychiatric is negative. ECOG PS remains 0.    Objective:      Physical Exam    She is alert, oriented to time, place, person, pleasant, well      nourished, in no acute physical distress.                                    VITAL SIGNS:  Reviewed                                      HEENT:  Normal.  There are no nasal, oral, lip, gingival, auricular, lid,    or conjunctival lesions.  Mucosae are moist and pink, and there is no        thrush.  Pupils are equal, reactive to light and accommodation.              Extraocular muscle movements are intact.    Dentition is good.  There is no frontal or maxillary tendernress                                   NECK:  Supple without JVD, adenopathy, or thyromegaly.                       LUNGS:  Clear to auscultation without wheezing, rales, or rhonchi.           CARDIOVASCULAR:  Reveals an S1, S2, no murmurs, no rubs, no gallops.         ABDOMEN:  Soft, nontender, without organomegaly.  Bowel sounds are    present.                                                                     EXTREMITIES:  No cyanosis, clubbing, or edema.                               BREASTS:  She is status post left lumpectomy with a well-healed incision.  There is mild hyperpigmentation within the radiation field  There are no masses in either breast.  There is a small mole at the 8 o' clock position in the right breast.                                      LYMPHATIC:  There is no cervical, axillary, or supraclavicular adenopathy.   SKIN:  Warm and moist, without petechiae, rashes, induration, or ecchymoses.           NEUROLOGIC:  DTRs are 0-1+ bilaterally, symmetrical, motor function is 5/5,  and cranial nerves are  within normal limits.      Assessment:       1. History of breast cancer-Ductal carcinoma in situ (DCIS) of left breast          Plan:       Doing well, JEANIE.  Next mammogram in 1 year.   RTC to see Dr. Durand in 6 months.   Continue to follow up with PCP for other health maintenance. Reminded that she needs Vit D yearly.       Patient is in agreement with the proposed treatment plan. All questions were answered to the patient's satisfaction. Pt knows to call clinic for any new or worsening symptoms and if anything is needed before the next clinic visit.      PRIYANK Carnes-JOCE  Hematology & Oncology  [address] [place] [address]  ph. [phone]  Fax. [phone]     I spent 30 minutes (face to face) with the patient, more than 50% was in counseling and coordination of care as detailed above.             Distress Screening Results: Psychosocial Distress screening score of Distress Score: 0 noted and reviewed. No intervention indicated.

## 2019-04-04 ENCOUNTER — OFFICE VISIT (OUTPATIENT)
Dept: VASCULAR SURGERY | Facility: CLINIC | Age: 64
End: 2019-04-04
Payer: COMMERCIAL

## 2019-04-04 VITALS
WEIGHT: 152 LBS | BODY MASS INDEX: 29.84 KG/M2 | HEIGHT: 60 IN | SYSTOLIC BLOOD PRESSURE: 134 MMHG | RESPIRATION RATE: 18 BRPM | DIASTOLIC BLOOD PRESSURE: 64 MMHG | HEART RATE: 68 BPM

## 2019-04-04 DIAGNOSIS — I87.2 VENOUS INSUFFICIENCY OF LEFT LOWER EXTREMITY: Primary | ICD-10-CM

## 2019-04-04 PROCEDURE — 99999 PR PBB SHADOW E&M-EST. PATIENT-LVL III: ICD-10-PCS | Mod: PBBFAC,,, | Performed by: THORACIC SURGERY (CARDIOTHORACIC VASCULAR SURGERY)

## 2019-04-04 PROCEDURE — 99205 OFFICE O/P NEW HI 60 MIN: CPT | Mod: S$GLB,,, | Performed by: THORACIC SURGERY (CARDIOTHORACIC VASCULAR SURGERY)

## 2019-04-04 PROCEDURE — 3008F BODY MASS INDEX DOCD: CPT | Mod: CPTII,S$GLB,, | Performed by: THORACIC SURGERY (CARDIOTHORACIC VASCULAR SURGERY)

## 2019-04-04 PROCEDURE — 1101F PR PT FALLS ASSESS DOC 0-1 FALLS W/OUT INJ PAST YR: ICD-10-PCS | Mod: CPTII,S$GLB,, | Performed by: THORACIC SURGERY (CARDIOTHORACIC VASCULAR SURGERY)

## 2019-04-04 PROCEDURE — 3008F PR BODY MASS INDEX (BMI) DOCUMENTED: ICD-10-PCS | Mod: CPTII,S$GLB,, | Performed by: THORACIC SURGERY (CARDIOTHORACIC VASCULAR SURGERY)

## 2019-04-04 PROCEDURE — 99999 PR PBB SHADOW E&M-EST. PATIENT-LVL III: CPT | Mod: PBBFAC,,, | Performed by: THORACIC SURGERY (CARDIOTHORACIC VASCULAR SURGERY)

## 2019-04-04 PROCEDURE — 1101F PT FALLS ASSESS-DOCD LE1/YR: CPT | Mod: CPTII,S$GLB,, | Performed by: THORACIC SURGERY (CARDIOTHORACIC VASCULAR SURGERY)

## 2019-04-04 PROCEDURE — 99205 PR OFFICE/OUTPT VISIT, NEW, LEVL V, 60-74 MIN: ICD-10-PCS | Mod: S$GLB,,, | Performed by: THORACIC SURGERY (CARDIOTHORACIC VASCULAR SURGERY)

## 2019-04-04 NOTE — PROGRESS NOTES
OFFICE VISIT NOTE    I was asked to see this patient by Pamella Heard, nurse practitioner for pain   and edema of bilateral lower extremities.  The patient was found to have venous   insufficiency of the left greater saphenous vein.  She has been complaining of   pain and edema for quite some time.  She has been using some compression   stockings.  The symptoms are worse towards the end of the day.  She also states   that she has degenerative disease of the knees and has been considering knee   replacements.  She has seen an orthopedist in the past and apparently has had   injections in the knees previously, which have helped.    PAST MEDICAL HISTORY:  Degenerative joint disease of the knees, asthma, back   pain, breast cancer, bronchitis, deep venous thrombosis, gastroesophageal reflux   disease, osteopenia, and prediabetes.    PAST SURGICAL HISTORY:  1.  Left breast biopsy.  2.  Breast lumpectomy.  3.  Cervical conization.  4.  Cholecystectomy.  5.  Colonoscopy.  6.  Hysterectomy.    ALLERGIES:  Adhesives.    MEDICATIONS:  Protonix, Deltasone, Zantac, Ventolin HFA, and Zofran ODT.    FAMILY HISTORY:  Cancer and diabetes.    SOCIAL HISTORY:  She used to smoke cigarettes, but quit in 2004.  Denies alcohol   use.    REVISION OF SYSTEMS:  She complains of pain and edema of bilateral legs.  She   also complains of pain in bilateral knees.  She has some small varicose and   reticular veins.  She states that she has had episodes of phlebitis of bilateral   lower extremities in the past.  All other systems are reviewed and are   negative.    PHYSICAL EXAMINATION:  VITAL SIGNS:  Blood pressure is 134/64, heart rate is 68, weight 152 pounds,   height 5 feet.  GENERAL:  She is awake and alert, in no apparent distress.  HEENT:  Head is normocephalic.  Pupils are equal, round, reactive.  Sclerae are   anicteric.  NECK:  Supple.  Trachea midline.  No masses.  LUNGS:  Clear.  HEART:  Has a regular rate and rhythm.  ABDOMEN:   Soft and nontender.  No masses.  Bowel sounds are present.  EXTREMITIES:  She has a trace edema of bilateral lower extremities.  She has   small reticular veins of bilateral lower extremities.  Bilateral feet are warm   and well perfused.  NEUROLOGIC:  Awake, alert and oriented.  No lateralizing neurologic deficits.    Ultrasound of the veins of the lower extremities showed venous insufficiency   only in the left greater saphenous vein.  This vein is normal in size.  There   was no venous insufficiency detected in any other vein of either lower   extremity.    IMPRESSION:  1.  Venous insufficiency of the left greater saphenous vein.  2.  Pain of bilateral lower extremities.  3.  Edema of bilateral lower extremities.  4.  Degenerative joint disease of bilateral knees.  5.  Breast cancer.  6.  History of deep venous thrombosis.  7.  Osteopenia.  8.  Back pain.    RECOMMENDATIONS:  The patient has venous insufficiency of the left greater   saphenous vein.  There was no venous insufficiency noted in any other vein of   either lower extremity.  There was no evidence of deep venous thrombosis and the   ultrasound done on 01/07/2019, which was her most recent study done.  I do not   think that the venous insufficiency is causing her pain and edema.  We can   ablate the left greater saphenous vein, but this is normal in size and I doubt   that this will help her symptoms.  It certainly will not help the symptoms in   the right lower extremity.  She does have degenerative joint disease of the   knees.  She has had relief of pain with injections into the knees by her   orthopedist.  For now, I will recommend leg elevation and compression stockings   and exercise.      CASA  dd: 04/04/2019 17:23:27 (CDT)  td: 04/05/2019 02:14:34 (CDT)  Doc ID   #[id number]  Job ID #[id number]    CC:

## 2019-04-04 NOTE — Clinical Note
April 4, 2019      [place]  [address]  [facility] [address]           [facility]  [address] [facility] [address] [place] [address]  Phone: [phone]          Patient: Mandi Cline   MR Number: [id number]   YOB: 1955   Date of Visit: 4/4/2019       Dear Pamella Heard:    Thank you for referring Mandi Cline to me for evaluation. Attached you will find relevant portions of my assessment and plan of care.    If you have questions, please do not hesitate to call me. I look forward to following Mandi Cline along with you.    Sincerely,    Danay Wiseman MD    Enclosure  CC:  No Recipients    If you would like to receive this communication electronically, please contact [email or web address] or [phone] to request more information on [facility] Link access.    For providers and/or their staff who would like to refer a patient to [facility], please contact us through our one-stop-shop provider referral line, [facility], at [phone].    If you feel you have received this communication in error or would no longer like to receive these types of communications, please e-mail [email or web address]

## 2019-06-03 ENCOUNTER — PATIENT OUTREACH (OUTPATIENT)
Dept: ADMINISTRATIVE | Facility: HOSPITAL | Age: 64
End: 2019-06-03

## 2019-06-03 NOTE — PROGRESS NOTES
Health Maintenance Due   Topic Date Due    Pneumococcal Vaccine (65+ Low/Medium Risk) (1 of 2 - PCV13) 03/07/2019    Colonoscopy  03/11/2019       Chart review complete/scrubbed 06/03/2019

## 2019-06-03 NOTE — LETTER
June 11, 2019    Mandi Anushka Cline  [address]  [address]  [place] [id number]             [facility]  [address]  [facility] [address]  Phone: [phone] Dear Mrs. Cline:    We have tried to reach you by mychart unsuccessfully.    [facility] is committed to your overall health.  To help you get the most out of each of your visits, we will review your information to make sure you are up to date on all of your recommended tests and/or procedures.       Pamella Heard NP  has found that your chart shows you may be due for the following:     Pneumococcal Vaccine   Colonoscopy     If you have had any of the above done at another facility, please bring the records with you or FAX them to [phone] so that your record at [facility] will be complete.  If you have not had any of these tests or procedures done recently and would like to complete this testing, please call [phone] or send a message through your [facility] portal to your provider's office.     If you have an upcoming scheduled appointment for the above test and/or procedures, please disregard this letter.     Karen MCCRAY, Panel Care Coordinator, [phone]        If you have any questions or concerns, please don't hesitate to call.

## 2019-06-17 ENCOUNTER — OFFICE VISIT (OUTPATIENT)
Dept: FAMILY MEDICINE | Facility: CLINIC | Age: 64
End: 2019-06-17
Payer: COMMERCIAL

## 2019-06-17 VITALS
WEIGHT: 154.88 LBS | HEIGHT: 60 IN | TEMPERATURE: 98 F | SYSTOLIC BLOOD PRESSURE: 124 MMHG | HEART RATE: 72 BPM | RESPIRATION RATE: 18 BRPM | DIASTOLIC BLOOD PRESSURE: 70 MMHG | OXYGEN SATURATION: 96 % | BODY MASS INDEX: 30.41 KG/M2

## 2019-06-17 DIAGNOSIS — Z23 IMMUNIZATION DUE: ICD-10-CM

## 2019-06-17 DIAGNOSIS — M54.9 BACK PAIN, UNSPECIFIED BACK LOCATION, UNSPECIFIED BACK PAIN LATERALITY, UNSPECIFIED CHRONICITY: ICD-10-CM

## 2019-06-17 DIAGNOSIS — Z85.3 HX: BREAST CANCER: ICD-10-CM

## 2019-06-17 DIAGNOSIS — E66.9 OBESITY (BMI 30-39.9): ICD-10-CM

## 2019-06-17 DIAGNOSIS — Z79.899 ENCOUNTER FOR LONG-TERM CURRENT USE OF MEDICATION: ICD-10-CM

## 2019-06-17 DIAGNOSIS — R73.03 PREDIABETES: Primary | ICD-10-CM

## 2019-06-17 DIAGNOSIS — E55.9 VITAMIN D DEFICIENCY: ICD-10-CM

## 2019-06-17 DIAGNOSIS — Z12.11 COLON CANCER SCREENING: ICD-10-CM

## 2019-06-17 PROCEDURE — 3288F PR FALLS RISK ASSESSMENT DOCUMENTED: ICD-10-PCS | Mod: CPTII,S$GLB,, | Performed by: NURSE PRACTITIONER

## 2019-06-17 PROCEDURE — 1100F PTFALLS ASSESS-DOCD GE2>/YR: CPT | Mod: CPTII,S$GLB,, | Performed by: NURSE PRACTITIONER

## 2019-06-17 PROCEDURE — 90471 PNEUMOCOCCAL CONJUGATE VACCINE 13-VALENT LESS THAN 5YO & GREATER THAN: ICD-10-PCS | Mod: S$GLB,,, | Performed by: NURSE PRACTITIONER

## 2019-06-17 PROCEDURE — 3008F BODY MASS INDEX DOCD: CPT | Mod: CPTII,S$GLB,, | Performed by: NURSE PRACTITIONER

## 2019-06-17 PROCEDURE — 3288F FALL RISK ASSESSMENT DOCD: CPT | Mod: CPTII,S$GLB,, | Performed by: NURSE PRACTITIONER

## 2019-06-17 PROCEDURE — 90670 PNEUMOCOCCAL CONJUGATE VACCINE 13-VALENT LESS THAN 5YO & GREATER THAN: ICD-10-PCS | Mod: S$GLB,,, | Performed by: NURSE PRACTITIONER

## 2019-06-17 PROCEDURE — 1100F PR PT FALLS ASSESS DOC 2+ FALLS/FALL W/INJURY/YR: ICD-10-PCS | Mod: CPTII,S$GLB,, | Performed by: NURSE PRACTITIONER

## 2019-06-17 PROCEDURE — 99214 PR OFFICE/OUTPT VISIT, EST, LEVL IV, 30-39 MIN: ICD-10-PCS | Mod: 25,S$GLB,, | Performed by: NURSE PRACTITIONER

## 2019-06-17 PROCEDURE — 90670 PCV13 VACCINE IM: CPT | Mod: S$GLB,,, | Performed by: NURSE PRACTITIONER

## 2019-06-17 PROCEDURE — 3008F PR BODY MASS INDEX (BMI) DOCUMENTED: ICD-10-PCS | Mod: CPTII,S$GLB,, | Performed by: NURSE PRACTITIONER

## 2019-06-17 PROCEDURE — 90471 IMMUNIZATION ADMIN: CPT | Mod: S$GLB,,, | Performed by: NURSE PRACTITIONER

## 2019-06-17 PROCEDURE — 99214 OFFICE O/P EST MOD 30 MIN: CPT | Mod: 25,S$GLB,, | Performed by: NURSE PRACTITIONER

## 2019-06-17 RX ORDER — CYCLOBENZAPRINE HCL 5 MG
1 TABLET ORAL 2 TIMES DAILY
Refills: 0 | COMMUNITY
Start: 2019-05-01 | End: 2019-09-05

## 2019-06-17 RX ORDER — TRAMADOL HYDROCHLORIDE 50 MG/1
50 TABLET ORAL EVERY 8 HOURS PRN
Refills: 0 | COMMUNITY
Start: 2019-05-13 | End: 2019-09-04

## 2019-06-17 RX ORDER — MELOXICAM 15 MG/1
15 TABLET ORAL DAILY
Refills: 1 | COMMUNITY
Start: 2019-05-08 | End: 2020-08-06

## 2019-06-17 RX ORDER — ASPIRIN 81 MG/1
81 TABLET ORAL DAILY
COMMUNITY
End: 2019-09-04

## 2019-06-17 NOTE — PATIENT INSTRUCTIONS
GI will call you to schedule your colonoscopy.    Have your fasting blood drawn soon.  Orders are in the [facility] computer sytem.

## 2019-08-01 ENCOUNTER — LAB VISIT (OUTPATIENT)
Dept: LAB | Facility: HOSPITAL | Age: 64
End: 2019-08-01
Attending: NURSE PRACTITIONER
Payer: MEDICAID

## 2019-08-01 DIAGNOSIS — E55.9 VITAMIN D DEFICIENCY: ICD-10-CM

## 2019-08-01 DIAGNOSIS — Z79.899 ENCOUNTER FOR LONG-TERM CURRENT USE OF MEDICATION: ICD-10-CM

## 2019-08-01 LAB
25(OH)D3+25(OH)D2 SERPL-MCNC: 29 NG/ML (ref 30–96)
ALBUMIN SERPL BCP-MCNC: 4.1 G/DL (ref 3.5–5.2)
ALP SERPL-CCNC: 52 U/L (ref 55–135)
ALT SERPL W/O P-5'-P-CCNC: 16 U/L (ref 10–44)
ANION GAP SERPL CALC-SCNC: 10 MMOL/L (ref 8–16)
AST SERPL-CCNC: 17 U/L (ref 10–40)
BASOPHILS # BLD AUTO: 0.04 K/UL (ref 0–0.2)
BASOPHILS NFR BLD: 0.8 % (ref 0–1.9)
BILIRUB SERPL-MCNC: 0.5 MG/DL (ref 0.1–1)
BUN SERPL-MCNC: 21 MG/DL (ref 8–23)
CALCIUM SERPL-MCNC: 9.6 MG/DL (ref 8.7–10.5)
CHLORIDE SERPL-SCNC: 105 MMOL/L (ref 95–110)
CHOLEST SERPL-MCNC: 189 MG/DL (ref 120–199)
CHOLEST/HDLC SERPL: 2.9 {RATIO} (ref 2–5)
CO2 SERPL-SCNC: 24 MMOL/L (ref 23–29)
CREAT SERPL-MCNC: 0.8 MG/DL (ref 0.5–1.4)
DIFFERENTIAL METHOD: ABNORMAL
EOSINOPHIL # BLD AUTO: 0.1 K/UL (ref 0–0.5)
EOSINOPHIL NFR BLD: 2.5 % (ref 0–8)
ERYTHROCYTE [DISTWIDTH] IN BLOOD BY AUTOMATED COUNT: 15.3 % (ref 11.5–14.5)
EST. GFR  (AFRICAN AMERICAN): >60 ML/MIN/1.73 M^2
EST. GFR  (NON AFRICAN AMERICAN): >60 ML/MIN/1.73 M^2
ESTIMATED AVG GLUCOSE: 117 MG/DL (ref 68–131)
GLUCOSE SERPL-MCNC: 101 MG/DL (ref 70–110)
HBA1C MFR BLD HPLC: 5.7 % (ref 4–5.6)
HCT VFR BLD AUTO: 43.7 % (ref 37–48.5)
HDLC SERPL-MCNC: 65 MG/DL (ref 40–75)
HDLC SERPL: 34.4 % (ref 20–50)
HGB BLD-MCNC: 13.9 G/DL (ref 12–16)
IMM GRANULOCYTES # BLD AUTO: 0.01 K/UL (ref 0–0.04)
IMM GRANULOCYTES NFR BLD AUTO: 0.2 % (ref 0–0.5)
LDLC SERPL CALC-MCNC: 112.4 MG/DL (ref 63–159)
LYMPHOCYTES # BLD AUTO: 2.3 K/UL (ref 1–4.8)
LYMPHOCYTES NFR BLD: 43.7 % (ref 18–48)
MCH RBC QN AUTO: 30.3 PG (ref 27–31)
MCHC RBC AUTO-ENTMCNC: 31.8 G/DL (ref 32–36)
MCV RBC AUTO: 95 FL (ref 82–98)
MONOCYTES # BLD AUTO: 0.5 K/UL (ref 0.3–1)
MONOCYTES NFR BLD: 9.2 % (ref 4–15)
NEUTROPHILS # BLD AUTO: 2.3 K/UL (ref 1.8–7.7)
NEUTROPHILS NFR BLD: 43.6 % (ref 38–73)
NONHDLC SERPL-MCNC: 124 MG/DL
NRBC BLD-RTO: 0 /100 WBC
PLATELET # BLD AUTO: 213 K/UL (ref 150–350)
PMV BLD AUTO: 11 FL (ref 9.2–12.9)
POTASSIUM SERPL-SCNC: 4.1 MMOL/L (ref 3.5–5.1)
PROT SERPL-MCNC: 8.1 G/DL (ref 6–8.4)
RBC # BLD AUTO: 4.58 M/UL (ref 4–5.4)
SODIUM SERPL-SCNC: 139 MMOL/L (ref 136–145)
TRIGL SERPL-MCNC: 58 MG/DL (ref 30–150)
TSH SERPL DL<=0.005 MIU/L-ACNC: 1.45 UIU/ML (ref 0.4–4)
WBC # BLD AUTO: 5.2 K/UL (ref 3.9–12.7)

## 2019-08-01 PROCEDURE — 80061 LIPID PANEL: CPT

## 2019-08-01 PROCEDURE — 85025 COMPLETE CBC W/AUTO DIFF WBC: CPT

## 2019-08-01 PROCEDURE — 80053 COMPREHEN METABOLIC PANEL: CPT

## 2019-08-01 PROCEDURE — 83036 HEMOGLOBIN GLYCOSYLATED A1C: CPT

## 2019-08-01 PROCEDURE — 36415 COLL VENOUS BLD VENIPUNCTURE: CPT | Mod: PO

## 2019-08-01 PROCEDURE — 82306 VITAMIN D 25 HYDROXY: CPT

## 2019-08-01 PROCEDURE — 84443 ASSAY THYROID STIM HORMONE: CPT

## 2019-08-26 ENCOUNTER — TELEPHONE (OUTPATIENT)
Dept: FAMILY MEDICINE | Facility: CLINIC | Age: 64
End: 2019-08-26

## 2019-08-26 NOTE — TELEPHONE ENCOUNTER
----- Message from Michel Armas sent at 8/26/2019  9:48 AM CDT -----  Contact: pt  Type:  Sooner Apoointment Request    Caller is requesting a sooner appointment.  Caller declined first available appointment listed below.  Caller will not accept being placed on the waitlist and is requesting a message be sent to doctor.    Name of Caller:  pt  When is the first available appointment?  Epic kept pushing it out without giving a date    Best Call Back Number:  [phone]  Additional Information:  Pt needs to r/s her appointment on 8/28/19 due to having another doctors appointment that day. Would like to be seen on a Tuesday or a Thursdsay.

## 2019-09-04 ENCOUNTER — INITIAL CONSULT (OUTPATIENT)
Dept: HEMATOLOGY/ONCOLOGY | Facility: CLINIC | Age: 64
End: 2019-09-04
Payer: MEDICAID

## 2019-09-04 VITALS
RESPIRATION RATE: 16 BRPM | BODY MASS INDEX: 31.51 KG/M2 | OXYGEN SATURATION: 98 % | SYSTOLIC BLOOD PRESSURE: 137 MMHG | DIASTOLIC BLOOD PRESSURE: 67 MMHG | WEIGHT: 160.5 LBS | HEIGHT: 60 IN | HEART RATE: 63 BPM | TEMPERATURE: 98 F

## 2019-09-04 DIAGNOSIS — Z85.3 HISTORY OF BREAST CANCER: Primary | ICD-10-CM

## 2019-09-04 PROCEDURE — 99214 OFFICE O/P EST MOD 30 MIN: CPT | Mod: S$PBB,,, | Performed by: INTERNAL MEDICINE

## 2019-09-04 PROCEDURE — 99999 PR PBB SHADOW E&M-EST. PATIENT-LVL III: ICD-10-PCS | Mod: PBBFAC,,, | Performed by: INTERNAL MEDICINE

## 2019-09-04 PROCEDURE — 99214 PR OFFICE/OUTPT VISIT, EST, LEVL IV, 30-39 MIN: ICD-10-PCS | Mod: S$PBB,,, | Performed by: INTERNAL MEDICINE

## 2019-09-04 PROCEDURE — 99999 PR PBB SHADOW E&M-EST. PATIENT-LVL III: CPT | Mod: PBBFAC,,, | Performed by: INTERNAL MEDICINE

## 2019-09-04 PROCEDURE — 99213 OFFICE O/P EST LOW 20 MIN: CPT | Mod: PBBFAC,PN | Performed by: INTERNAL MEDICINE

## 2019-09-04 RX ORDER — ACETAMINOPHEN 500 MG
500 TABLET ORAL EVERY 6 HOURS PRN
COMMUNITY

## 2019-09-04 RX ORDER — IBUPROFEN 200 MG
200 TABLET ORAL EVERY 6 HOURS PRN
COMMUNITY

## 2019-09-04 NOTE — LETTER
September 4, 2019      [place]  [address]  [facility] [address]           [facility] [address] [address]  Phone: [phone]  Fax: [phone]          Patient: Mandi Cline   MR Number: [id number]   YOB: 1955   Date of Visit: 9/4/2019       Dear Pamella Heard:    Thank you for referring Mandi Cline to me for evaluation. Attached you will find relevant portions of my assessment and plan of care.    If you have questions, please do not hesitate to call me. I look forward to following Mandi Cline along with you.    Sincerely,    Ronnie Ward MD    Enclosure  CC:  No Recipients    If you would like to receive this communication electronically, please contact [email or web address] or [phone] to request more information on [facility] [facility] access.    For providers and/or their staff who would like to refer a patient to [facility], please contact us through our one-stop-shop provider referral line, [facility] [place], at [phone].    If you feel you have received this communication in error or would no longer like to receive these types of communications, please e-mail [email or web address]

## 2019-09-04 NOTE — PROGRESS NOTES
HPI    63 years old female presented to Oncology Clinic for evaluation treatment of breast cancer.  Patient was previously seen by Kasi Callaway at [facility].  She has regionally from [place] who was currently lives in [address]  Her history dated back to August 2015 when a routine screening mammogram shows calcification in the superior part of left breast.  Core needle biopsy of 2 lesions showed high-grade DCIS.  Cell strongly ER positive but only 10% will mildly GA positive.  Patient was referred to Dr. Richards and underwent initial wire localization lumpectomy on September 25, 2015.  Pathology revealed extensive solid high-grade DCIS measuring 4 cm in diameter with extensive comedonecrosis.  Due to extremely close margin patient underwent re-excision on October 20, 2015.  Analysis pathology shows positive margin subsequently patient return to OR on November 12, 2015 for further re-excision of positive margin.  Pathology from November 12, 2015 review no residual DCIS a margin is close at 0.6 mm.  Patient underwent radiation therapy to the breast.  She completed her radiation in March and she was referred to Oncology at that time for evaluation and treatment.  Adjuvant hormone therapy was against it due to prior history of DVT x2.    Past Medical History:   Diagnosis Date    Arthritis     knees    Asthma     seasonal, last attack was 5 years ago.    Back pain     Breast cancer     2015 left    Bronchitis     usually has once a year    DVT (deep venous thrombosis)     2    GERD (gastroesophageal reflux disease)     History of breast cancer     Osteopenia     Prediabetes     Venous reflux     Wears glasses      Social History     Socioeconomic History    Marital status:      Spouse name: Not on file    Number of children: Not on file    Years of education: Not on file    Highest education level: Not on file   Occupational History    Not on file   Social  Needs    Financial resource strain: Not on file    Food insecurity:     Worry: Not on file     Inability: Not on file    Transportation needs:     Medical: Not on file     Non-medical: Not on file   Tobacco Use    Smoking status: Former Smoker     Last attempt to quit: 2004     Years since quittin.7    Smokeless tobacco: Never Used   Substance and Sexual Activity    Alcohol use: No    Drug use: No    Sexual activity: Not Currently     Partners: Male   Lifestyle    Physical activity:     Days per week: Not on file     Minutes per session: Not on file    Stress: Not on file   Relationships    Social connections:     Talks on phone: Not on file     Gets together: Not on file     Attends [employer] service: Not on file     Active member of club or organization: Not on file     Attends meetings of clubs or organizations: Not on file     Relationship status: Not on file   Other Topics Concern    Are you pregnant or think you may be? No    Breast-feeding No   Social History Narrative    Not on file       Objective    Physical Exam   Vitals:    19 1007   BP: 137/67   Pulse: 63   Resp: 16   Temp: 98.4 °F (36.9 °C)       Constitutional:  Alert oriented x3 no acute distress   HENT:  Normocephalic atraumatic pupils equal round reactive to light extraocular muscle intact  Neck: Trachea normal and normal range of motion. No thyromegaly   Cardiovascular:  Normal rate  Pulmonary/Chest:  Clear to auscultate   Abdominal:  Soft nontender nondistended   Musculoskeletal: Normal range of motion. Gait is normal No clubbing, cyanosis     Lymphadenopathy:  No evidence lymphadenopathy  Neurological: patient is alert and oriented to person, place, and time. patient has normal strength and normal reflexes. No sensory deficit. Gait normal.   Skin: Skin is warm, dry and intact. No bruising, no lesion and no rash noted. No cyanosis. Nails show no clubbing.   No lesions   Psychiatric: patient has a normal mood and  affect. patient speech is normal and behavior is normal. Judgment normal. Cognition and memory are normal.   Vitals reviewed.     CMP  Sodium   Date Value Ref Range Status   08/01/2019 139 136 - 145 mmol/L Final     Potassium   Date Value Ref Range Status   08/01/2019 4.1 3.5 - 5.1 mmol/L Final     Chloride   Date Value Ref Range Status   08/01/2019 105 95 - 110 mmol/L Final     CO2   Date Value Ref Range Status   08/01/2019 24 23 - 29 mmol/L Final     Glucose   Date Value Ref Range Status   08/01/2019 101 70 - 110 mg/dL Final     BUN, Bld   Date Value Ref Range Status   08/01/2019 21 8 - 23 mg/dL Final     Creatinine   Date Value Ref Range Status   08/01/2019 0.8 0.5 - 1.4 mg/dL Final   05/14/2013 0.9 0.5 - 1.4 mg/dL Final     Calcium   Date Value Ref Range Status   08/01/2019 9.6 8.7 - 10.5 mg/dL Final   05/14/2013 9.9 8.7 - 10.5 mg/dL Final     Total Protein   Date Value Ref Range Status   08/01/2019 8.1 6.0 - 8.4 g/dL Final     Albumin   Date Value Ref Range Status   08/01/2019 4.1 3.5 - 5.2 g/dL Final     Total Bilirubin   Date Value Ref Range Status   08/01/2019 0.5 0.1 - 1.0 mg/dL Final     Comment:     For infants and newborns, interpretation of results should be based  on gestational age, weight and in agreement with clinical  observations.  Premature Infant recommended reference ranges:  Up to 24 hours.............<8.0 mg/dL  Up to 48 hours............<12.0 mg/dL  3-5 days..................<15.0 mg/dL  6-29 days.................<15.0 mg/dL       Alkaline Phosphatase   Date Value Ref Range Status   08/01/2019 52 (L) 55 - 135 U/L Final   05/14/2013 63 55 - 135 U/L Final     AST   Date Value Ref Range Status   08/01/2019 17 10 - 40 U/L Final   05/14/2013 18 10 - 40 U/L Final     ALT   Date Value Ref Range Status   08/01/2019 16 10 - 44 U/L Final     Anion Gap   Date Value Ref Range Status   08/01/2019 10 8 - 16 mmol/L Final   05/14/2013 11 5 - 15 meq/L Final     eGFR if    Date Value Ref Range  Status   08/01/2019 >60.0 >60 mL/min/1.73 m^2 Final     eGFR if non    Date Value Ref Range Status   08/01/2019 >60.0 >60 mL/min/1.73 m^2 Final     Comment:     Calculation used to obtain the estimated glomerular filtration  rate (eGFR) is the CKD-EPI equation.        Lab Results   Component Value Date    WBC 5.20 08/01/2019    HGB 13.9 08/01/2019    HCT 43.7 08/01/2019    MCV 95 08/01/2019     08/01/2019     Bilateral mammogram digital 03/20/2019  Left  There are post-surgical findings from a previous lumpectomy seen in the left breast. There has been no interval development of a suspicious mass, microcalcification, or architectural distortion.      Right  There is no evidence of suspicious masses, calcifications, or other abnormal findings.    Assessment    Breast CA diagnosed 2015 DCIS.  Treated with surgery and re-excision to achive  clear margin.  No adjuvant endocrine therapy given given the patient has by history of osteoporosis as well as history of DVT.    Does for patient doing well no mammographic ultrasound evidence of malignancy.  Next schedule for mammogram in March 2020.    Will have patient return to clinic March 2020 for evaluation mammogram results.    Patient initially seen in [place] given the logistic and transportation patient wants to transfer care at [facility].      Continue follow-up of primary care physicians office visit and all other medical management.

## 2019-09-05 ENCOUNTER — TELEPHONE (OUTPATIENT)
Dept: FAMILY MEDICINE | Facility: CLINIC | Age: 64
End: 2019-09-05

## 2019-09-05 ENCOUNTER — OFFICE VISIT (OUTPATIENT)
Dept: FAMILY MEDICINE | Facility: CLINIC | Age: 64
End: 2019-09-05
Payer: MEDICARE

## 2019-09-05 VITALS
RESPIRATION RATE: 16 BRPM | HEART RATE: 62 BPM | HEIGHT: 60 IN | DIASTOLIC BLOOD PRESSURE: 68 MMHG | WEIGHT: 160.69 LBS | BODY MASS INDEX: 31.55 KG/M2 | SYSTOLIC BLOOD PRESSURE: 122 MMHG | TEMPERATURE: 98 F

## 2019-09-05 DIAGNOSIS — E55.9 VITAMIN D DEFICIENCY: ICD-10-CM

## 2019-09-05 DIAGNOSIS — K21.9 GASTROESOPHAGEAL REFLUX DISEASE, ESOPHAGITIS PRESENCE NOT SPECIFIED: ICD-10-CM

## 2019-09-05 DIAGNOSIS — E66.9 OBESITY (BMI 30-39.9): ICD-10-CM

## 2019-09-05 DIAGNOSIS — Z12.11 COLON CANCER SCREENING: ICD-10-CM

## 2019-09-05 DIAGNOSIS — M54.9 BACK PAIN, UNSPECIFIED BACK LOCATION, UNSPECIFIED BACK PAIN LATERALITY, UNSPECIFIED CHRONICITY: ICD-10-CM

## 2019-09-05 DIAGNOSIS — R73.03 PREDIABETES: Primary | ICD-10-CM

## 2019-09-05 PROCEDURE — G0008 ADMIN INFLUENZA VIRUS VAC: HCPCS | Mod: S$GLB,,, | Performed by: NURSE PRACTITIONER

## 2019-09-05 PROCEDURE — G0008 FLU VACCINE - HIGH DOSE (65+) PRESERVATIVE FREE IM: ICD-10-PCS | Mod: S$GLB,,, | Performed by: NURSE PRACTITIONER

## 2019-09-05 PROCEDURE — 99214 PR OFFICE/OUTPT VISIT, EST, LEVL IV, 30-39 MIN: ICD-10-PCS | Mod: 25,S$GLB,, | Performed by: NURSE PRACTITIONER

## 2019-09-05 PROCEDURE — 99214 OFFICE O/P EST MOD 30 MIN: CPT | Mod: 25,S$GLB,, | Performed by: NURSE PRACTITIONER

## 2019-09-05 PROCEDURE — 90662 FLU VACCINE - HIGH DOSE (65+) PRESERVATIVE FREE IM: ICD-10-PCS | Mod: S$GLB,,, | Performed by: NURSE PRACTITIONER

## 2019-09-05 PROCEDURE — 90662 IIV NO PRSV INCREASED AG IM: CPT | Mod: S$GLB,,, | Performed by: NURSE PRACTITIONER

## 2019-09-05 RX ORDER — PANTOPRAZOLE SODIUM 40 MG/1
40 TABLET, DELAYED RELEASE ORAL DAILY
Qty: 30 TABLET | Refills: 11 | Status: SHIPPED | OUTPATIENT
Start: 2019-09-05 | End: 2020-01-09 | Stop reason: SDUPTHER

## 2019-09-05 NOTE — PROGRESS NOTES
Subjective:       Patient ID: Mandi Cline is a 63 y.o. female.    Chief Complaint: Follow-up    The patient is here for a follow-up visit.  She is generally feeling well today without any new complaints.    The patient is planning to have a knee replacement on the right knee 9/30/19 with Dr. Chetan Pineda.      She also has the following active problems.  1. Prediabetes-Lab Results       Component                Value               Date                       LDLCALC                  92.2                03/13/2018            Lab Results       Component                Value               Date                       LDLCALC                  112.4               08/01/2019                      2.  Vitamin-D deficiency-vitamin-D 29.  She is not currently taking her vitamin-D replacement consistently.  3. Degenerative DDD cervical and lumbar-receiving epidural injections.   4. GERD-stable with the use of Protonix and p.r.n. use of Zantac.     Review of Systems   Constitutional: Negative for appetite change, chills and fever.   HENT: Negative for ear pain and postnasal drip.    Eyes: Negative for pain and itching.   Respiratory: Negative for chest tightness and shortness of breath.    Gastrointestinal: Negative for abdominal distention and abdominal pain.   Endocrine: Negative for polydipsia and polyuria.   Genitourinary: Negative for difficulty urinating and flank pain.   Musculoskeletal: Positive for arthralgias and back pain.   Skin: Negative for color change and pallor.   Neurological: Negative for light-headedness and headaches.   Hematological: Negative for adenopathy. Does not bruise/bleed easily.   Psychiatric/Behavioral: Negative for agitation.       Past medical, surgical, family and social history reviewed.  Objective:     Vitals:    09/05/19 1411   BP: 122/68   Pulse: 62   Resp: 16   Temp: 98 °F (36.7 °C)   TempSrc: Oral   Weight: 72.9 kg (160 lb 11.5 oz)   Height: 5' (1.524 m)   PainSc:   8    PainLoc: Knee     Body mass index is 31.39 kg/m².     Physical Exam   Constitutional: She is oriented to person, place, and time. She appears well-developed. No distress.   Obese female    HENT:   Head: Normocephalic and atraumatic.   Right Ear: Hearing, tympanic membrane, external ear and ear canal normal.   Left Ear: Hearing, tympanic membrane, external ear and ear canal normal.   Nose: Nose normal.   Mouth/Throat: Uvula is midline, oropharynx is clear and moist and mucous membranes are normal.   Eyes: Pupils are equal, round, and reactive to light. Conjunctivae and EOM are normal. Right eye exhibits no discharge. Left eye exhibits no discharge.   Neck: Trachea normal and normal range of motion. Neck supple. No JVD present. Carotid bruit is not present. No thyromegaly present.   Cardiovascular: Normal rate and regular rhythm. Exam reveals no gallop and no friction rub.   No murmur heard.  Pulmonary/Chest: Effort normal and breath sounds normal. No respiratory distress. She has no wheezes. She has no rales. She exhibits no tenderness.   Abdominal: Soft. Bowel sounds are normal. She exhibits no distension and no mass. There is no tenderness. There is no rebound and no guarding.   Musculoskeletal: Normal range of motion.   Neurological: She is alert and oriented to person, place, and time. Coordination normal.   Skin: Skin is warm and dry. She is not diaphoretic.   Psychiatric: She has a normal mood and affect. Her behavior is normal. Judgment and thought content normal.       Assessment:       1. Prediabetes    2. Colon cancer screening    3. Obesity (BMI 30-39.9)    4. Vitamin D deficiency    5. Back pain, unspecified back location, unspecified back pain laterality, unspecified chronicity        Plan:       Mandi was seen today for follow-up.    Diagnoses and all orders for this visit:    Prediabetes  Diet and exercise discussed.    Colon cancer screening  -     Ambulatory consult to Gastroenterology    Obesity  (BMI 30-39.9)  Weight loss discussed    Vitamin D deficiency  Continue OTC vitamin-D daily.    Back pain, unspecified back location, unspecified back pain laterality, unspecified chronicity   continue follow-up with pain management      Gastroesophageal reflux disease, esophagitis presence not specified  -     pantoprazole (PROTONIX) 40 MG tablet; Take 1 tablet (40 mg total) by mouth once daily.  -     ranitidine (ZANTAC) 150 MG tablet; Take 1 tablet (150 mg total) by mouth 2 (two) times daily.      -     varicella-zoster [id number], PF, (SHINGRIX, PF,) 50 mcg/0.5 mL injection; Inject 0.5 mLs into the muscle once. for 1 dose  -     Influenza - High Dose (65+) (PF) (IM)

## 2019-09-05 NOTE — TELEPHONE ENCOUNTER
[facility] . --lp

## 2019-09-05 NOTE — PATIENT INSTRUCTIONS
Please contact the gastroenterologist to schedule an appointment.      [facility] [place]. Eric FOX Mihai  Dr. Alphonse Whatley  [address] [place], [place]    [phone]

## 2019-12-26 ENCOUNTER — PATIENT OUTREACH (OUTPATIENT)
Dept: ADMINISTRATIVE | Facility: HOSPITAL | Age: 64
End: 2019-12-26

## 2019-12-26 NOTE — PROGRESS NOTES
Future Appointments   Date Time Provider Department Center   1/9/2020  2:00 PM Pamella Heard NP [facility] MED [place]   3/30/2020  1:00 PM [facility] MAMMO1 [facility] MAMMO [place]   4/6/2020  9:20 AM [place] [facility] [facility]     [facility] Due   Topic Date Due    Shingles Vaccine (2 of 3) 12/13/2017    Colonoscopy  03/11/2019

## 2020-01-09 ENCOUNTER — OFFICE VISIT (OUTPATIENT)
Dept: FAMILY MEDICINE | Facility: CLINIC | Age: 65
End: 2020-01-09
Payer: MEDICARE

## 2020-01-09 VITALS
BODY MASS INDEX: 32 KG/M2 | WEIGHT: 163 LBS | HEIGHT: 60 IN | HEART RATE: 64 BPM | SYSTOLIC BLOOD PRESSURE: 110 MMHG | DIASTOLIC BLOOD PRESSURE: 62 MMHG | OXYGEN SATURATION: 98 % | TEMPERATURE: 98 F

## 2020-01-09 DIAGNOSIS — R73.03 PRE-DIABETES: Primary | ICD-10-CM

## 2020-01-09 DIAGNOSIS — E55.9 VITAMIN D DEFICIENCY: ICD-10-CM

## 2020-01-09 DIAGNOSIS — Z79.899 ENCOUNTER FOR LONG-TERM CURRENT USE OF MEDICATION: ICD-10-CM

## 2020-01-09 DIAGNOSIS — E66.9 OBESITY (BMI 30-39.9): ICD-10-CM

## 2020-01-09 DIAGNOSIS — K21.9 GASTROESOPHAGEAL REFLUX DISEASE, ESOPHAGITIS PRESENCE NOT SPECIFIED: ICD-10-CM

## 2020-01-09 DIAGNOSIS — Z85.3 HISTORY OF BREAST CANCER: ICD-10-CM

## 2020-01-09 PROCEDURE — 1159F MED LIST DOCD IN RCRD: CPT | Mod: S$GLB,,, | Performed by: NURSE PRACTITIONER

## 2020-01-09 PROCEDURE — 1125F AMNT PAIN NOTED PAIN PRSNT: CPT | Mod: S$GLB,,, | Performed by: NURSE PRACTITIONER

## 2020-01-09 PROCEDURE — 99214 OFFICE O/P EST MOD 30 MIN: CPT | Mod: S$GLB,,, | Performed by: NURSE PRACTITIONER

## 2020-01-09 PROCEDURE — 1159F PR MEDICATION LIST DOCUMENTED IN MEDICAL RECORD: ICD-10-PCS | Mod: S$GLB,,, | Performed by: NURSE PRACTITIONER

## 2020-01-09 PROCEDURE — 99214 PR OFFICE/OUTPT VISIT, EST, LEVL IV, 30-39 MIN: ICD-10-PCS | Mod: S$GLB,,, | Performed by: NURSE PRACTITIONER

## 2020-01-09 PROCEDURE — 1125F PR PAIN SEVERITY QUANTIFIED, PAIN PRESENT: ICD-10-PCS | Mod: S$GLB,,, | Performed by: NURSE PRACTITIONER

## 2020-01-09 RX ORDER — GABAPENTIN 300 MG/1
300 CAPSULE ORAL 3 TIMES DAILY
COMMUNITY
End: 2022-08-09

## 2020-01-09 RX ORDER — HYDROCODONE BITARTRATE AND ACETAMINOPHEN 5; 325 MG/1; MG/1
TABLET ORAL
COMMUNITY
Start: 2019-11-13 | End: 2020-08-06

## 2020-01-09 RX ORDER — PANTOPRAZOLE SODIUM 40 MG/1
40 TABLET, DELAYED RELEASE ORAL DAILY
Qty: 90 TABLET | Refills: 3 | Status: SHIPPED | OUTPATIENT
Start: 2020-01-09 | End: 2020-08-06

## 2020-01-13 NOTE — PROGRESS NOTES
Subjective:       Patient ID: Mandi Cline is a 64 y.o. female.    Chief Complaint: Follow-up    The patient is here for a follow-up visit.  She is generally feeling well today without any new complaints.        She also has the following active problems.  1. Prediabetes-Lab Results       Component                Value               Date                       HGBA1C                   5.7 (H)             08/01/2019              2.  Vitamin-D deficiency-vitamin-D 29.  She is not currently taking her vitamin-D replacement consistently.  3. Degenerative DDD cervical and lumbar-receiving epidural injections.   4. GERD-stable with the use of Protonix and p.r.n. use of Zantac.   5. Hx breast cancer-followed by Oncology         Review of Systems   Constitutional: Negative for activity change and appetite change.   HENT: Negative for congestion, postnasal drip, rhinorrhea and sinus pressure.    Eyes: Negative for pain and redness.   Respiratory: Negative for choking and chest tightness.    Gastrointestinal: Negative for abdominal distention, abdominal pain, blood in stool, constipation, diarrhea, nausea and vomiting.   Endocrine: Negative for polydipsia and polyphagia.   Genitourinary: Negative for dysuria and hematuria.   Musculoskeletal: Positive for arthralgias. Negative for myalgias.   Skin: Negative for color change and rash.   Neurological: Negative for dizziness and headaches.   Psychiatric/Behavioral: Negative for agitation and behavioral problems.       Past medical, surgical, family and social history reviewed.  Objective:     Vitals:    01/09/20 1405   BP: 110/62   Pulse: 64   Temp: 98.1 °F (36.7 °C)   TempSrc: Oral   SpO2: 98%   Weight: 73.9 kg (163 lb 0.5 oz)   Height: 5' (1.524 m)   PainSc:   2   PainLoc: Knee     Body mass index is 31.84 kg/m².     Physical Exam   Constitutional: She is oriented to person, place, and time. She appears well-developed. No distress.   Obese female    HENT:   Head:  Normocephalic and atraumatic.   Right Ear: Hearing, tympanic membrane, external ear and ear canal normal.   Left Ear: Hearing, tympanic membrane, external ear and ear canal normal.   Nose: Nose normal.   Mouth/Throat: Uvula is midline, oropharynx is clear and moist and mucous membranes are normal.   Eyes: Pupils are equal, round, and reactive to light. Conjunctivae and EOM are normal. Right eye exhibits no discharge. Left eye exhibits no discharge.   Neck: Trachea normal and normal range of motion. Neck supple. No JVD present. Carotid bruit is not present. No thyromegaly present.   Cardiovascular: Normal rate and regular rhythm. Exam reveals no gallop and no friction rub.   No murmur heard.  Pulmonary/Chest: Effort normal and breath sounds normal. No respiratory distress. She has no wheezes. She has no rales. She exhibits no tenderness.   Abdominal: Soft. Bowel sounds are normal. She exhibits no distension and no mass. There is no tenderness. There is no rebound and no guarding.   Musculoskeletal: Normal range of motion.   Neurological: She is alert and oriented to person, place, and time. Coordination normal.   Skin: Skin is warm and dry. She is not diaphoretic.   Psychiatric: She has a normal mood and affect. Her behavior is normal. Judgment and thought content normal.       Assessment:       1. Pre-diabetes    2. Encounter for long-term current use of medication    3. History of breast cancer-Ductal carcinoma in situ (DCIS) of left breast    4. Obesity (BMI 30-39.9)    5. Vitamin D deficiency    6. Gastroesophageal reflux disease, esophagitis presence not specified        Plan:       Mandi was seen today for follow-up.    Diagnoses and all orders for this visit:    Prediabetes    Pre-diabetes  -     Cancel: Hemoglobin A1c    Encounter for long-term current use of medication  -     Cancel: CBC auto differential  -     Cancel: Comprehensive metabolic panel  -     Cancel: Lipid panel  -     Cancel: TSH  -     CBC auto  differential; Future  -     Comprehensive metabolic panel; Future  -     Hemoglobin A1c; Future  -     Lipid panel; Future  -     TSH; Future    Other orders  -     pantoprazole (PROTONIX) 40 MG tablet; Take 1 tablet (40 mg total) by mouth once daily.        Mandi was seen today for follow-up.    Diagnoses and all orders for this visit:    Pre-diabetes    Encounter for long-term current use of medication  -     Cancel: CBC auto differential  -     Cancel: Comprehensive metabolic panel  -     Cancel: Lipid panel  -     Cancel: TSH  -     CBC auto differential; Future  -     Comprehensive metabolic panel; Future  -     Hemoglobin A1c; Future  -     Lipid panel; Future  -     TSH; Future    History of breast cancer-Ductal carcinoma in situ (DCIS) of left breast  Continue follow-up with Oncology    Obesity (BMI 30-39.9)  Weight loss discussed    Vitamin D deficiency  Continue vitamin-D replacement    Gastroesophageal reflux disease, esophagitis presence not specified  -     pantoprazole (PROTONIX) 40 MG tablet; Take 1 tablet (40 mg total) by mouth once daily.

## 2020-02-26 RX ORDER — MONTELUKAST SODIUM 10 MG/1
10 TABLET ORAL NIGHTLY
Qty: 90 TABLET | Refills: 3 | Status: SHIPPED | OUTPATIENT
Start: 2020-02-26 | End: 2020-03-27

## 2020-02-26 RX ORDER — ALBUTEROL SULFATE 90 UG/1
AEROSOL, METERED RESPIRATORY (INHALATION)
Qty: 18 G | Refills: 3 | Status: SHIPPED | OUTPATIENT
Start: 2020-02-26 | End: 2020-08-06 | Stop reason: SDUPTHER

## 2020-02-26 NOTE — TELEPHONE ENCOUNTER
----- Message from Mery Egan sent at 2/26/2020 10:28 AM CST -----  Contact: pt   Pt needing a call back regarding a medication refill    Pt contact # [phone]

## 2020-04-07 ENCOUNTER — PATIENT OUTREACH (OUTPATIENT)
Dept: ADMINISTRATIVE | Facility: HOSPITAL | Age: 65
End: 2020-04-07

## 2020-04-07 NOTE — PROGRESS NOTES
Chart review completed 04/07/2020.  Care Everywhere updates requested and reviewed.  Immunizations reconciled. Media reviewed.

## 2020-04-08 ENCOUNTER — TELEPHONE (OUTPATIENT)
Dept: FAMILY MEDICINE | Facility: CLINIC | Age: 65
End: 2020-04-08

## 2020-04-08 NOTE — TELEPHONE ENCOUNTER
Called to change patient's appt to a video visit.  No answer, left message advising with call back number.  Portal message sent advising as well.

## 2020-04-14 ENCOUNTER — TELEPHONE (OUTPATIENT)
Dept: FAMILY MEDICINE | Facility: CLINIC | Age: 65
End: 2020-04-14

## 2020-04-14 NOTE — TELEPHONE ENCOUNTER
----- Message from Rosita De La Rosa MA sent at 4/14/2020  4:31 PM CDT -----  Contact: patient  Type: Needs Medical Advice  Who Called:  Mandi  Sonu Call Back Number:   Additional Information: patient states she is still having symptoms and feels she needs antibiotics

## 2020-04-14 NOTE — TELEPHONE ENCOUNTER
"Doris,  ID# [id number]     2/27/2020 - seen for Pneumonia (2/26 ER visit reports flu type A) and has since completed medications but is still experiencing sore throat.     Patient reports she is concerned about being seen in office due to COVID-19 and has not left her home since February but does not have capabilities for video visit. Patient denies any other symptoms aside from a sore throat, "top of my throat, possibly tonsils swollen." Patient also reports she has tried all home remedies she could think of (gargling,etc) with no relief.   "

## 2020-04-27 ENCOUNTER — OFFICE VISIT (OUTPATIENT)
Dept: FAMILY MEDICINE | Facility: CLINIC | Age: 65
End: 2020-04-27
Payer: MEDICARE

## 2020-04-27 DIAGNOSIS — B37.0 THRUSH: Primary | ICD-10-CM

## 2020-04-27 PROCEDURE — 99442 PR PHYSICIAN TELEPHONE EVALUATION 11-20 MIN: CPT | Mod: 95,,, | Performed by: NURSE PRACTITIONER

## 2020-04-27 PROCEDURE — 99442 PR PHYSICIAN TELEPHONE EVALUATION 11-20 MIN: ICD-10-PCS | Mod: 95,,, | Performed by: NURSE PRACTITIONER

## 2020-04-27 RX ORDER — FLUCONAZOLE 150 MG/1
150 TABLET ORAL WEEKLY
Qty: 2 TABLET | Refills: 0 | Status: SHIPPED | OUTPATIENT
Start: 2020-04-27 | End: 2020-05-05

## 2020-04-27 RX ORDER — NYSTATIN 100000 [USP'U]/ML
6 SUSPENSION ORAL 4 TIMES DAILY
Qty: 240 ML | Refills: 0 | Status: SHIPPED | OUTPATIENT
Start: 2020-04-27 | End: 2020-05-07

## 2020-04-27 NOTE — PROGRESS NOTES
Audio Only Telehealth Visit     The patient location is: home  The chief complaint leading to consultation is: sore throat  Visit type: Virtual visit with audio only (telephone)     The reason for the audio only service rather than synchronous audio and video virtual visit was related to technical difficulties or patient preference/necessity.     Each patient to whom I provide medical services by telemedicine is:  (1) informed of the relationship between the physician and patient and the respective role of any other health care provider with respect to management of the patient; and (2) notified that they may decline to receive medical services by telemedicine and may withdraw from such care at any time. Patient verbally consented to receive this service via voice-only telephone call.       HPI:  The patient tells me that back in February she was diagnosed with pneumonia.  Since then she has been having a sore throat.  She tells me over the past couple weeks she noticed that she has a white area to her tongue and throat and some oral sores in her mouth.  She feels like this does hurt very much.  She denies any fever or chills.  No cough or congestion.  She did have antibiotics when she was treated for pneumonia back in February.  She has been using warm salt water gargles with little relief.    Review of Systems   Constitutional: Negative for activity change and appetite change.   HENT: Negative for congestion, postnasal drip, rhinorrhea and sinus pressure.    Eyes: Negative for pain and redness.   Respiratory: Negative for choking and chest tightness.    Gastrointestinal: Negative for abdominal distention, abdominal pain, blood in stool, constipation, diarrhea, nausea and vomiting.   Endocrine: Negative for polydipsia and polyphagia.   Genitourinary: Negative for dysuria and hematuria.   Musculoskeletal: Negative for arthralgias and myalgias.   Skin: Negative for color change and rash.   Neurological: Negative for  dizziness and headaches.   Psychiatric/Behavioral: Negative for agitation and behavioral problems.     Physical Exam   Constitutional: The patient is oriented to person, place, and time. He appears well-developed and well-nourished.   HENT: Head: Normocephalic and atraumatic.  Skin: Skin dry.   Psychiatric: Normal mood and affect.    Assess/Plan:    Diagnoses and all orders for this visit:    Thrush    Other orders  -     nystatin (MYCOSTATIN) 100,000 unit/mL suspension; Take 6 mLs (600,000 Units total) by mouth 4 (four) times daily. for 10 days  -     fluconazole (DIFLUCAN) 150 MG Tab; Take 1 tablet (150 mg total) by mouth once a week. for 2 doses               This service was not originating from a related E/M service provided within the previous 7 days nor will  to an E/M service or procedure within the next 24 hours or my soonest available appointment.  Prevailing standard of care was able to be met in this audio-only visit.

## 2020-05-12 ENCOUNTER — TELEPHONE (OUTPATIENT)
Dept: FAMILY MEDICINE | Facility: CLINIC | Age: 65
End: 2020-05-12

## 2020-05-12 NOTE — TELEPHONE ENCOUNTER
----- Message from Yue Escalona sent at 5/12/2020 11:16 AM CDT -----  Contact: Patient  Name of Caller  : Patient    Reason for Visit/Symptoms: says treatments for throat are not working    Best Contact Number or Confirm if Mychart Preferred: [phone]    Preferred Date/Time of Appointment: ASAP    Interested in Virtual Visit (yes/no)L no    Additional Information

## 2020-05-15 ENCOUNTER — TELEPHONE (OUTPATIENT)
Dept: FAMILY MEDICINE | Facility: CLINIC | Age: 65
End: 2020-05-15

## 2020-05-15 NOTE — TELEPHONE ENCOUNTER
Pt came in at 1:48 for her 1:20 appointment. I explained in English that Leta said that she missed her appointment and she has to reschedule.  Pt requested someone who speaks Danish. Per Dr Sandhu, contacted the language line at [phone]. Interpretor. We told the patient that the request to reschedule came from her provider, but the patient is upset. She stated she was going to another clinic.

## 2020-06-03 ENCOUNTER — HOSPITAL ENCOUNTER (OUTPATIENT)
Dept: RADIOLOGY | Facility: HOSPITAL | Age: 65
Discharge: HOME | End: 2020-06-03
Attending: INTERNAL MEDICINE
Payer: MEDICARE

## 2020-06-03 DIAGNOSIS — Z85.3 HISTORY OF BREAST CANCER: ICD-10-CM

## 2020-06-03 PROCEDURE — 77066 MAMMO DIGITAL DIAGNOSTIC BILAT WITH TOMOSYNTHESIS_CAD: ICD-10-PCS | Mod: 26,,, | Performed by: RADIOLOGY

## 2020-06-03 PROCEDURE — 77062 BREAST TOMOSYNTHESIS BI: CPT | Mod: 26,,, | Performed by: RADIOLOGY

## 2020-06-03 PROCEDURE — 77066 DX MAMMO INCL CAD BI: CPT | Mod: 26,,, | Performed by: RADIOLOGY

## 2020-06-03 PROCEDURE — 77062 MAMMO DIGITAL DIAGNOSTIC BILAT WITH TOMOSYNTHESIS_CAD: ICD-10-PCS | Mod: 26,,, | Performed by: RADIOLOGY

## 2020-06-03 PROCEDURE — 77062 BREAST TOMOSYNTHESIS BI: CPT | Mod: TC,PO

## 2020-06-05 ENCOUNTER — OFFICE VISIT (OUTPATIENT)
Dept: HEMATOLOGY/ONCOLOGY | Facility: CLINIC | Age: 65
End: 2020-06-05
Payer: MEDICARE

## 2020-06-05 VITALS
DIASTOLIC BLOOD PRESSURE: 76 MMHG | SYSTOLIC BLOOD PRESSURE: 149 MMHG | BODY MASS INDEX: 34.77 KG/M2 | TEMPERATURE: 98 F | WEIGHT: 177.13 LBS | HEART RATE: 71 BPM | HEIGHT: 60 IN | OXYGEN SATURATION: 97 % | RESPIRATION RATE: 14 BRPM

## 2020-06-05 DIAGNOSIS — E66.9 OBESITY (BMI 30-39.9): ICD-10-CM

## 2020-06-05 DIAGNOSIS — Z85.3 HISTORY OF BREAST CANCER: Primary | ICD-10-CM

## 2020-06-05 DIAGNOSIS — K21.9 GASTROESOPHAGEAL REFLUX DISEASE, ESOPHAGITIS PRESENCE NOT SPECIFIED: ICD-10-CM

## 2020-06-05 PROCEDURE — 99999 PR PBB SHADOW E&M-EST. PATIENT-LVL III: ICD-10-PCS | Mod: PBBFAC,,, | Performed by: INTERNAL MEDICINE

## 2020-06-05 PROCEDURE — 1159F MED LIST DOCD IN RCRD: CPT | Mod: S$GLB,,, | Performed by: INTERNAL MEDICINE

## 2020-06-05 PROCEDURE — 1126F AMNT PAIN NOTED NONE PRSNT: CPT | Mod: S$GLB,,, | Performed by: INTERNAL MEDICINE

## 2020-06-05 PROCEDURE — 1126F PR PAIN SEVERITY QUANTIFIED, NO PAIN PRESENT: ICD-10-PCS | Mod: S$GLB,,, | Performed by: INTERNAL MEDICINE

## 2020-06-05 PROCEDURE — 99214 PR OFFICE/OUTPT VISIT, EST, LEVL IV, 30-39 MIN: ICD-10-PCS | Mod: S$GLB,,, | Performed by: INTERNAL MEDICINE

## 2020-06-05 PROCEDURE — 99214 OFFICE O/P EST MOD 30 MIN: CPT | Mod: S$GLB,,, | Performed by: INTERNAL MEDICINE

## 2020-06-05 PROCEDURE — 99999 PR PBB SHADOW E&M-EST. PATIENT-LVL III: CPT | Mod: PBBFAC,,, | Performed by: INTERNAL MEDICINE

## 2020-06-05 PROCEDURE — 1101F PR PT FALLS ASSESS DOC 0-1 FALLS W/OUT INJ PAST YR: ICD-10-PCS | Mod: CPTII,S$GLB,, | Performed by: INTERNAL MEDICINE

## 2020-06-05 PROCEDURE — 1159F PR MEDICATION LIST DOCUMENTED IN MEDICAL RECORD: ICD-10-PCS | Mod: S$GLB,,, | Performed by: INTERNAL MEDICINE

## 2020-06-05 PROCEDURE — 1101F PT FALLS ASSESS-DOCD LE1/YR: CPT | Mod: CPTII,S$GLB,, | Performed by: INTERNAL MEDICINE

## 2020-06-05 NOTE — PROGRESS NOTES
HPI    63 years old female presented to Oncology Clinic for evaluation treatment of breast cancer.  Patient was previously seen by Kasi Callaway at [facility].  She has regionally from [place] who was currently lives in [address].  Her history dated back to August 2015 when a routine screening mammogram shows calcification in the superior part of left breast.  Core needle biopsy of 2 lesions showed high-grade DCIS.  Cell strongly ER positive but only 10% will mildly FL positive.  Patient was referred to Dr. Richards and underwent initial wire localization lumpectomy on September 25, 2015.  Pathology revealed extensive solid high-grade DCIS measuring 4 cm in diameter with extensive comedonecrosis.  Due to extremely close margin patient underwent re-excision on October 20, 2015.  Analysis pathology shows positive margin subsequently patient return to OR on November 12, 2015 for further re-excision of positive margin.  Pathology from November 12, 2015 review no residual DCIS a margin is close at 0.6 mm.  Patient underwent radiation therapy to the breast.  She completed her radiation in March and she was referred to Oncology at that time for evaluation and treatment.  Adjuvant hormone therapy was against it due to prior history of DVT x2.    Past Medical History:   Diagnosis Date    Arthritis     knees    Asthma     seasonal, last attack was 5 years ago.    Back pain     Breast cancer     2015 left    Bronchitis     usually has once a year    DVT (deep venous thrombosis)     2    GERD (gastroesophageal reflux disease)     History of breast cancer     Osteopenia     Prediabetes     Venous reflux     Wears glasses      Social History     Socioeconomic History    Marital status:      Spouse name: Not on file    Number of children: Not on file    Years of education: Not on file    Highest education level: Not on file   Occupational History    Not on file   Social  Needs    Financial resource strain: Not on file    Food insecurity:     Worry: Not on file     Inability: Not on file    Transportation needs:     Medical: Not on file     Non-medical: Not on file   Tobacco Use    Smoking status: Former Smoker     Last attempt to quit: 12/22/2004     Years since quitting: 15.4    Smokeless tobacco: Never Used   Substance and Sexual Activity    Alcohol use: No    Drug use: No    Sexual activity: Not Currently     Partners: Male   Lifestyle    Physical activity:     Days per week: Not on file     Minutes per session: Not on file    Stress: Not on file   Relationships    Social connections:     Talks on phone: Not on file     Gets together: Not on file     Attends [employer] service: Not on file     Active member of club or organization: Not on file     Attends meetings of clubs or organizations: Not on file     Relationship status: Not on file   Other Topics Concern    Are you pregnant or think you may be? No    Breast-feeding No   Social History Narrative    Not on file       Objective    Physical Exam   Vitals:    06/05/20 0913   BP: (!) 149/76   Pulse: 71   Resp: 14   Temp: 97.6 °F (36.4 °C)       Constitutional:  Alert oriented x3 no acute distress   HENT:  Normocephalic atraumatic pupils equal round reactive to light extraocular muscle intact  Neck: Trachea normal and normal range of motion. No thyromegaly   Cardiovascular:  Normal rate  Pulmonary/Chest:  Clear to auscultate   Abdominal:  Soft nontender nondistended   Musculoskeletal: Normal range of motion. Gait is normal No clubbing, cyanosis     Lymphadenopathy:  No evidence lymphadenopathy  Neurological: patient is alert and oriented to person, place, and time. patient has normal strength and normal reflexes. No sensory deficit. Gait normal.   Skin: Skin is warm, dry and intact. No bruising, no lesion and no rash noted. No cyanosis. Nails show no clubbing.   No lesions   Psychiatric: patient has a normal mood and  affect. patient speech is normal and behavior is normal. Judgment normal. Cognition and memory are normal.   Vitals reviewed.     CMP  Sodium   Date Value Ref Range Status   08/01/2019 139 136 - 145 mmol/L Final     Potassium   Date Value Ref Range Status   08/01/2019 4.1 3.5 - 5.1 mmol/L Final     Chloride   Date Value Ref Range Status   08/01/2019 105 95 - 110 mmol/L Final     CO2   Date Value Ref Range Status   08/01/2019 24 23 - 29 mmol/L Final     Glucose   Date Value Ref Range Status   08/01/2019 101 70 - 110 mg/dL Final     BUN, Bld   Date Value Ref Range Status   08/01/2019 21 8 - 23 mg/dL Final     Creatinine   Date Value Ref Range Status   08/01/2019 0.8 0.5 - 1.4 mg/dL Final   05/14/2013 0.9 0.5 - 1.4 mg/dL Final     Calcium   Date Value Ref Range Status   08/01/2019 9.6 8.7 - 10.5 mg/dL Final   05/14/2013 9.9 8.7 - 10.5 mg/dL Final     Total Protein   Date Value Ref Range Status   08/01/2019 8.1 6.0 - 8.4 g/dL Final     Albumin   Date Value Ref Range Status   08/01/2019 4.1 3.5 - 5.2 g/dL Final     Total Bilirubin   Date Value Ref Range Status   08/01/2019 0.5 0.1 - 1.0 mg/dL Final     Comment:     For infants and newborns, interpretation of results should be based  on gestational age, weight and in agreement with clinical  observations.  Premature Infant recommended reference ranges:  Up to 24 hours.............<8.0 mg/dL  Up to 48 hours............<12.0 mg/dL  3-5 days..................<15.0 mg/dL  6-29 days.................<15.0 mg/dL       Alkaline Phosphatase   Date Value Ref Range Status   08/01/2019 52 (L) 55 - 135 U/L Final   05/14/2013 63 55 - 135 U/L Final     AST   Date Value Ref Range Status   08/01/2019 17 10 - 40 U/L Final   05/14/2013 18 10 - 40 U/L Final     ALT   Date Value Ref Range Status   08/01/2019 16 10 - 44 U/L Final     Anion Gap   Date Value Ref Range Status   08/01/2019 10 8 - 16 mmol/L Final   05/14/2013 11 5 - 15 meq/L Final     eGFR if    Date Value Ref Range  Status   08/01/2019 >60.0 >60 mL/min/1.73 m^2 Final     eGFR if non    Date Value Ref Range Status   08/01/2019 >60.0 >60 mL/min/1.73 m^2 Final     Comment:     Calculation used to obtain the estimated glomerular filtration  rate (eGFR) is the CKD-EPI equation.        Lab Results   Component Value Date    WBC 5.20 08/01/2019    HGB 13.9 08/01/2019    HCT 43.7 08/01/2019    MCV 95 08/01/2019     08/01/2019     Bilateral mammogram digital 03/20/2019  Left  There are post-surgical findings from a previous lumpectomy seen in the left breast. There has been no interval development of a suspicious mass, microcalcification, or architectural distortion.      Right  There is no evidence of suspicious masses, calcifications, or other abnormal findings.    Assessment    Breast CA diagnosed 2015 DCIS.  Treated with surgery and re-excision to achive  clear margin.  No adjuvant endocrine therapy given given the patient has by history of osteoporosis as well as history of DVT.  > Thus far patient doing well no mammographic ultrasound evidence of malignancy.   mammogram in 06/03/2020 no evidence malignancy  >Patient initially seen in [place] given the logistic and transportation patient wants to transfer care at [facility].  > patient makes request to transfer primary care physician to [facility].  Will make referral.  > detailed breast exam no evidence recurrence 6/5/20  > RTC 6 months CBC cmp       Obesity Acid reflux  > primary care follow      Continue follow-up of primary care physicians office visit and all other medical management.

## 2020-08-06 PROBLEM — M54.50 CHRONIC MIDLINE LOW BACK PAIN WITHOUT SCIATICA: Status: ACTIVE | Noted: 2020-08-06

## 2020-08-06 PROBLEM — B35.1 ONYCHOMYCOSIS: Status: ACTIVE | Noted: 2020-08-06

## 2020-08-06 PROBLEM — G89.29 CHRONIC MIDLINE LOW BACK PAIN WITHOUT SCIATICA: Status: ACTIVE | Noted: 2020-08-06

## 2020-08-06 PROBLEM — E66.09 CLASS 1 OBESITY DUE TO EXCESS CALORIES WITH SERIOUS COMORBIDITY AND BODY MASS INDEX (BMI) OF 33.0 TO 33.9 IN ADULT: Status: ACTIVE | Noted: 2020-08-06

## 2020-09-23 ENCOUNTER — OFFICE VISIT (OUTPATIENT)
Dept: OTOLARYNGOLOGY | Facility: CLINIC | Age: 65
End: 2020-09-23
Payer: MEDICARE

## 2020-09-23 VITALS — WEIGHT: 164.44 LBS | TEMPERATURE: 97 F | BODY MASS INDEX: 32.12 KG/M2

## 2020-09-23 DIAGNOSIS — K11.20 SIALADENITIS: Primary | ICD-10-CM

## 2020-09-23 DIAGNOSIS — J31.2 CHRONIC SORE THROAT: ICD-10-CM

## 2020-09-23 PROCEDURE — 1159F MED LIST DOCD IN RCRD: CPT | Mod: S$GLB,,, | Performed by: OTOLARYNGOLOGY

## 2020-09-23 PROCEDURE — 99204 PR OFFICE/OUTPT VISIT, NEW, LEVL IV, 45-59 MIN: ICD-10-PCS | Mod: S$GLB,,, | Performed by: OTOLARYNGOLOGY

## 2020-09-23 PROCEDURE — 1101F PR PT FALLS ASSESS DOC 0-1 FALLS W/OUT INJ PAST YR: ICD-10-PCS | Mod: CPTII,S$GLB,, | Performed by: OTOLARYNGOLOGY

## 2020-09-23 PROCEDURE — 1159F PR MEDICATION LIST DOCUMENTED IN MEDICAL RECORD: ICD-10-PCS | Mod: S$GLB,,, | Performed by: OTOLARYNGOLOGY

## 2020-09-23 PROCEDURE — 1125F PR PAIN SEVERITY QUANTIFIED, PAIN PRESENT: ICD-10-PCS | Mod: S$GLB,,, | Performed by: OTOLARYNGOLOGY

## 2020-09-23 PROCEDURE — 99999 PR PBB SHADOW E&M-EST. PATIENT-LVL III: ICD-10-PCS | Mod: PBBFAC,,, | Performed by: OTOLARYNGOLOGY

## 2020-09-23 PROCEDURE — 1125F AMNT PAIN NOTED PAIN PRSNT: CPT | Mod: S$GLB,,, | Performed by: OTOLARYNGOLOGY

## 2020-09-23 PROCEDURE — 3008F BODY MASS INDEX DOCD: CPT | Mod: CPTII,S$GLB,, | Performed by: OTOLARYNGOLOGY

## 2020-09-23 PROCEDURE — 3008F PR BODY MASS INDEX (BMI) DOCUMENTED: ICD-10-PCS | Mod: CPTII,S$GLB,, | Performed by: OTOLARYNGOLOGY

## 2020-09-23 PROCEDURE — 99204 OFFICE O/P NEW MOD 45 MIN: CPT | Mod: S$GLB,,, | Performed by: OTOLARYNGOLOGY

## 2020-09-23 PROCEDURE — 1101F PT FALLS ASSESS-DOCD LE1/YR: CPT | Mod: CPTII,S$GLB,, | Performed by: OTOLARYNGOLOGY

## 2020-09-23 PROCEDURE — 99999 PR PBB SHADOW E&M-EST. PATIENT-LVL III: CPT | Mod: PBBFAC,,, | Performed by: OTOLARYNGOLOGY

## 2020-09-23 RX ORDER — AMOXICILLIN 875 MG/1
875 TABLET, FILM COATED ORAL EVERY 12 HOURS
Qty: 28 TABLET | Refills: 0 | Status: SHIPPED | OUTPATIENT
Start: 2020-09-23 | End: 2020-10-07

## 2020-09-23 NOTE — LETTER
September 24, 2020      Sendy Valdez MD  [address] [place] [address]           [facility]  [address] [place] [address]  Phone: [phone]  Fax: [phone]          Patient: Mandi Cline   MR Number: [id number]   YOB: 1955   Date of Visit: 9/23/2020       Dear Dr. Sendy Valdez:    Thank you for referring Mandi Cline to me for evaluation. Attached you will find relevant portions of my assessment and plan of care.    If you have questions, please do not hesitate to call me. I look forward to following Mandi Cline along with you.    Sincerely,    Mack Gonzales MD    Enclosure  CC:  No Recipients    If you would like to receive this communication electronically, please contact [email or web address] or [phone] to request more information on [employer] Link access.    For providers and/or their staff who would like to refer a patient to [facility], please contact us through our one-stop-shop provider referral line, [facility], at [phone].    If you feel you have received this communication in error or would no longer like to receive these types of communications, please e-mail [email or web address]

## 2020-09-23 NOTE — PROGRESS NOTES
"Subjective:       Patient ID: Mandi Cline is a 64 y.o. female.    Chief Complaint: Sore Throat    Mandi Reveles is here for sore throat.   Length of symptoms: 6 mos. She had PNA in Feb, and since that time, she has had intermittent pain. She feels discomfort below her oral cavity in her "glands." She had a dry sensation which preceded this. Throbbing pain, described as "about to have a throat infection." No specific time of day worse. She denies pain within her throat. She has tenderness with palpation of her submandibular region, symmetric and bilateral. She points to her submental region as the pain but describes it as roof of mouth, oral cavity pain. 3 glasses of water per day. 3-4 big cups of coffee per day.     She has GERD and takes PPI prn.     History was obtained through use of      Pertinent meds: Gabapentin, Singulair, Nexium  Pertinent medical issues: GERD, Backpain    Social History     Tobacco Use   Smoking Status Former Smoker    Packs/day: 0.25    Years: 2.00    Pack years: 0.50    Types: Cigarettes    Quit date: 12/22/2004    Years since quitting: 15.7   Smokeless Tobacco Never Used     Social History     Substance and Sexual Activity   Alcohol Use No          Review of Systems   Constitutional: Negative for activity change and appetite change.   Eyes: Negative for discharge.   Respiratory: Negative for difficulty breathing and wheezing   Cardiovascular: Negative for chest pain.   Gastrointestinal: Negative for abdominal distention and abdominal pain.   Endocrine: Negative for cold intolerance and heat intolerance.   Genitourinary: Negative for dysuria.   Musculoskeletal: Negative for gait problem and joint swelling.   Skin: Negative for color change and pallor.   Neurological: Negative for syncope and weakness.   Psychiatric/Behavioral: Negative for agitation and confusion.     Objective:        Constitutional:   She is oriented to person, place, and time. She appears " well-developed and well-nourished. She appears alert. She is active. Normal speech.      Head:  Normocephalic and atraumatic. Head is without TMJ tenderness. No scars. Salivary glands normal.  Facial strength is normal.      Ears:    Right Ear: No drainage or swelling. No middle ear effusion.   Left Ear: No drainage or swelling.  No middle ear effusion.     Nose:  No mucosal edema, rhinorrhea or sinus tenderness. No turbinate hypertrophy.      Mouth/Throat  Oropharynx clear and moist without lesions or asymmetry, normal uvula midline and mirror exam normal. Normal dentition. No uvula swelling, lacerations or trismus. No oropharyngeal exudate. Tonsillar erythema, tonsillar exudate.    Cords mobile, base of tongue normal. Larynx no edema      Neck:  Full range of motion with neck supple and no adenopathy. Thyroid tenderness is present. No tracheal deviation, no edema, no erythema, normal range of motion, no stridor, no crepitus and no neck rigidity present. No thyroid mass present.   Bilat mild tenderness Level Ib.      Cardiovascular:   Intact distal pulses and normal pulses.      Pulmonary/Chest:   Effort normal and breath sounds normal. No stridor.     Psychiatric:   Her speech is normal and behavior is normal. Her mood appears not anxious. Her affect is not labile.     Neurological:   She is alert and oriented to person, place, and time. No sensory deficit.     Skin:   No abrasions, lacerations, lesions, or rashes. No abrasion and no bruising noted.         Tests / Results:  none    Assessment:       1. Sialadenitis    2. Chronic sore throat          Plan:         Suspect mild chronic sialadenitis based on location of symptoms  We discussed decr coffee, improving hydration, massage / warm compresses  Amoxicillin x 14 days  FU if persistent, consider imaging

## 2020-09-23 NOTE — PATIENT INSTRUCTIONS
The pain you have is near the salivary glands (spit glands.)  It is common to have salivary gland issues after a bad infection due to dehydration  It is important to drink plenty of water and decrease your coffee intake.  Massage and warm compresses of the neck  Take the entire antibiotic prescribed    If persistent, I will recommend some imaging

## 2020-10-07 ENCOUNTER — OFFICE VISIT (OUTPATIENT)
Dept: PODIATRY | Facility: CLINIC | Age: 65
End: 2020-10-07
Payer: MEDICARE

## 2020-10-07 VITALS — HEIGHT: 60 IN | BODY MASS INDEX: 32.25 KG/M2 | WEIGHT: 164.25 LBS

## 2020-10-07 DIAGNOSIS — B35.1 FUNGAL INFECTION OF NAIL: ICD-10-CM

## 2020-10-07 DIAGNOSIS — B35.1 ONYCHOMYCOSIS DUE TO DERMATOPHYTE: Primary | ICD-10-CM

## 2020-10-07 PROCEDURE — 99214 PR OFFICE/OUTPT VISIT, EST, LEVL IV, 30-39 MIN: ICD-10-PCS | Mod: S$GLB,,, | Performed by: PODIATRIST

## 2020-10-07 PROCEDURE — 3008F BODY MASS INDEX DOCD: CPT | Mod: CPTII,S$GLB,, | Performed by: PODIATRIST

## 2020-10-07 PROCEDURE — 3008F PR BODY MASS INDEX (BMI) DOCUMENTED: ICD-10-PCS | Mod: CPTII,S$GLB,, | Performed by: PODIATRIST

## 2020-10-07 PROCEDURE — 1126F AMNT PAIN NOTED NONE PRSNT: CPT | Mod: S$GLB,,, | Performed by: PODIATRIST

## 2020-10-07 PROCEDURE — 99999 PR PBB SHADOW E&M-EST. PATIENT-LVL III: ICD-10-PCS | Mod: PBBFAC,,, | Performed by: PODIATRIST

## 2020-10-07 PROCEDURE — 1101F PT FALLS ASSESS-DOCD LE1/YR: CPT | Mod: CPTII,S$GLB,, | Performed by: PODIATRIST

## 2020-10-07 PROCEDURE — 1159F PR MEDICATION LIST DOCUMENTED IN MEDICAL RECORD: ICD-10-PCS | Mod: S$GLB,,, | Performed by: PODIATRIST

## 2020-10-07 PROCEDURE — 1159F MED LIST DOCD IN RCRD: CPT | Mod: S$GLB,,, | Performed by: PODIATRIST

## 2020-10-07 PROCEDURE — 1126F PR PAIN SEVERITY QUANTIFIED, NO PAIN PRESENT: ICD-10-PCS | Mod: S$GLB,,, | Performed by: PODIATRIST

## 2020-10-07 PROCEDURE — 1101F PR PT FALLS ASSESS DOC 0-1 FALLS W/OUT INJ PAST YR: ICD-10-PCS | Mod: CPTII,S$GLB,, | Performed by: PODIATRIST

## 2020-10-07 PROCEDURE — 99214 OFFICE O/P EST MOD 30 MIN: CPT | Mod: S$GLB,,, | Performed by: PODIATRIST

## 2020-10-07 PROCEDURE — 99999 PR PBB SHADOW E&M-EST. PATIENT-LVL III: CPT | Mod: PBBFAC,,, | Performed by: PODIATRIST

## 2020-10-07 RX ORDER — TERBINAFINE HYDROCHLORIDE 250 MG/1
250 TABLET ORAL DAILY
Qty: 90 TABLET | Refills: 0 | Status: SHIPPED | OUTPATIENT
Start: 2020-10-07 | End: 2021-01-05

## 2020-10-07 NOTE — LETTER
October 7, 2020      Sendy Valdez MD  [address] [place] [address]           [facility] [address] [place] [address]  Phone: [phone]          Patient: Mandi Cline   MR Number: [id number]   YOB: 1955   Date of Visit: 10/7/2020       Dear Dr. Sendy Valdez:    Thank you for referring Mandi Cline to me for evaluation. Attached you will find relevant portions of my assessment and plan of care.    If you have questions, please do not hesitate to call me. I look forward to following Mandi Cline along with you.    Sincerely,    Zach Burch, DPMAHENDRA    Enclosure  CC:  No Recipients    If you would like to receive this communication electronically, please contact [email or web address] or [phone] to request more information on [employer] Link access.    For providers and/or their staff who would like to refer a patient to [facility], please contact us through our one-stop-shop provider referral line, [facility] , at [phone].    If you feel you have received this communication in error or would no longer like to receive these types of communications, please e-mail [email or web address]

## 2020-10-07 NOTE — PROGRESS NOTES
"Subjective:      Patient ID: Mandi Cline is a 64 y.o. female.    Chief Complaint: Nail Problem ("fungus")    Mandi Reveles is a 64 y.o. female who presents to the podiatry clinic  with complaint of  bilateral great toes with nails that are turning dark and discolored. She reports no pain but there is thickening and they bother her would like to discuss treatment options. She was seen with an  through [facility].     Review of Systems   Constitution: Negative for chills and fever.   Cardiovascular: Negative for claudication and leg swelling.   Respiratory: Negative for shortness of breath.    Skin: Positive for nail changes and suspicious lesions. Negative for itching and rash.   Musculoskeletal: Negative for muscle cramps, muscle weakness and myalgias.   Gastrointestinal: Negative for nausea and vomiting.   Neurological: Negative for focal weakness, loss of balance, numbness and paresthesias.           Objective:      Physical Exam  Constitutional:       General: She is not in acute distress.     Appearance: She is well-developed. She is not diaphoretic.   Cardiovascular:      Pulses:           Dorsalis pedis pulses are 2+ on the right side and 2+ on the left side.        Posterior tibial pulses are 2+ on the right side and 2+ on the left side.      Comments: < 3 sec capillary refill time to toes 1-5 bilateral. Toes and feet are warm to touch proximally with normal distal cooling b/l. There is some hair growth on the feet and toes b/l. There is no edema b/l. No spider veins or varicosities present b/l.     Musculoskeletal:      Comments: Adductovarus fifth toe rotations bilateral    Equinus noted b/l ankles with < 10 deg DF noted. MMT 5/5 in DF/PF/Inv/Ev resistance with no reproduction of pain in any direction. Passive range of motion of ankle and pedal joints is painless b/l.     Skin:     General: Skin is warm and dry.      Coloration: Skin is not pale.      Findings: Lesion present. No abrasion, " bruising, burn, ecchymosis, erythema, laceration, petechiae or rash.      Nails: There is no clubbing.        Comments: Skin temperature, texture and turgor within normal limits.    There are several hyperkeratotic lesions to the plantar forefoot bilateral    Bilateral hallux nails are darkened discoloration slightly thickened without subungual debris.    Bilateral fifth toes the nails are coming back with a thin spicule of nail growing out   Neurological:      Mental Status: She is alert and oriented to person, place, and time.      Sensory: No sensory deficit.      Motor: No tremor, atrophy or abnormal muscle tone.      Comments: Negative tinel sign bilateral.   Psychiatric:         Behavior: Behavior normal.               Assessment:       Encounter Diagnoses   Name Primary?    Fungal infection of nail     Onychomycosis due to dermatophyte Yes         Plan:       Mandi Reveles was seen today for nail problem.    Diagnoses and all orders for this visit:    Onychomycosis due to dermatophyte  -     terbinafine HCL (LAMISIL) 250 mg tablet; Take 1 tablet (250 mg total) by mouth once daily.  -     Hepatic function panel; Future    Fungal infection of nail  -     Ambulatory referral/consult to Podiatry  -     terbinafine HCL (LAMISIL) 250 mg tablet; Take 1 tablet (250 mg total) by mouth once daily.  -     Hepatic function panel; Future      I counseled the patient on her conditions, their implications and medical management.    We discussed using Lamisil for onychomycosis. This drug offers a fairly high but not universal cure rate. A 12 week treatment course is recommended. The patient is aware that rare cases of liver injury have been reported; and agrees to come in for liver function tests at 6 weeks of treatment. The symptoms of liver disease have been discussed; call if such occurs.    6 weeks return for lab draw to test liver function, function normal in September when last tested    Return ROSE Burch,  HAIR

## 2020-10-20 ENCOUNTER — OFFICE VISIT (OUTPATIENT)
Dept: GASTROENTEROLOGY | Facility: CLINIC | Age: 65
End: 2020-10-20
Payer: MEDICARE

## 2020-10-20 VITALS — BODY MASS INDEX: 32.37 KG/M2 | WEIGHT: 164.88 LBS | HEIGHT: 60 IN | RESPIRATION RATE: 18 BRPM

## 2020-10-20 DIAGNOSIS — R13.10 ODYNOPHAGIA: ICD-10-CM

## 2020-10-20 DIAGNOSIS — Z01.812 PRE-PROCEDURE LAB EXAM: ICD-10-CM

## 2020-10-20 DIAGNOSIS — K21.9 GASTROESOPHAGEAL REFLUX DISEASE, UNSPECIFIED WHETHER ESOPHAGITIS PRESENT: ICD-10-CM

## 2020-10-20 DIAGNOSIS — J02.9 SORE THROAT: Primary | ICD-10-CM

## 2020-10-20 PROCEDURE — 1101F PT FALLS ASSESS-DOCD LE1/YR: CPT | Mod: CPTII,S$GLB,, | Performed by: INTERNAL MEDICINE

## 2020-10-20 PROCEDURE — 1159F MED LIST DOCD IN RCRD: CPT | Mod: S$GLB,,, | Performed by: INTERNAL MEDICINE

## 2020-10-20 PROCEDURE — 99204 PR OFFICE/OUTPT VISIT, NEW, LEVL IV, 45-59 MIN: ICD-10-PCS | Mod: S$GLB,,, | Performed by: INTERNAL MEDICINE

## 2020-10-20 PROCEDURE — 1101F PR PT FALLS ASSESS DOC 0-1 FALLS W/OUT INJ PAST YR: ICD-10-PCS | Mod: CPTII,S$GLB,, | Performed by: INTERNAL MEDICINE

## 2020-10-20 PROCEDURE — 1125F PR PAIN SEVERITY QUANTIFIED, PAIN PRESENT: ICD-10-PCS | Mod: S$GLB,,, | Performed by: INTERNAL MEDICINE

## 2020-10-20 PROCEDURE — 99204 OFFICE O/P NEW MOD 45 MIN: CPT | Mod: S$GLB,,, | Performed by: INTERNAL MEDICINE

## 2020-10-20 PROCEDURE — 3008F BODY MASS INDEX DOCD: CPT | Mod: CPTII,S$GLB,, | Performed by: INTERNAL MEDICINE

## 2020-10-20 PROCEDURE — 99999 PR PBB SHADOW E&M-EST. PATIENT-LVL III: CPT | Mod: PBBFAC,,, | Performed by: INTERNAL MEDICINE

## 2020-10-20 PROCEDURE — 99999 PR PBB SHADOW E&M-EST. PATIENT-LVL III: ICD-10-PCS | Mod: PBBFAC,,, | Performed by: INTERNAL MEDICINE

## 2020-10-20 PROCEDURE — 1125F AMNT PAIN NOTED PAIN PRSNT: CPT | Mod: S$GLB,,, | Performed by: INTERNAL MEDICINE

## 2020-10-20 PROCEDURE — 3008F PR BODY MASS INDEX (BMI) DOCUMENTED: ICD-10-PCS | Mod: CPTII,S$GLB,, | Performed by: INTERNAL MEDICINE

## 2020-10-20 PROCEDURE — 1159F PR MEDICATION LIST DOCUMENTED IN MEDICAL RECORD: ICD-10-PCS | Mod: S$GLB,,, | Performed by: INTERNAL MEDICINE

## 2020-10-20 NOTE — LETTER
October 24, 2020      Sendy Valdez MD  [address] [place] [address]           [facility] [address] [place] [address]  Phone: [phone]          Patient: Mandi Cline   MR Number: [id number]   YOB: 1955   Date of Visit: 10/20/2020       Dear Dr. Sendy Valdez:    Thank you for referring Mandi Cline to me for evaluation. Attached you will find relevant portions of my assessment and plan of care.    If you have questions, please do not hesitate to call me. I look forward to following Mandi Cline along with you.    Sincerely,    Anthony Lee Jr., MD    Enclosure  CC:  No Recipients    If you would like to receive this communication electronically, please contact [email or web address] or [phone] to request more information on [employer] Link access.    For providers and/or their staff who would like to refer a patient to [facility], please contact us through our one-stop-shop provider referral line, [facility], at [phone].    If you feel you have received this communication in error or would no longer like to receive these types of communications, please e-mail [email or web address]

## 2020-10-20 NOTE — PROGRESS NOTES
"Subjective:       Patient ID: Mandi Cline is a 64 y.o. female.    Chief Complaint: No chief complaint on file.    This is my first encounter with Ms. Cline, who is from [place].  And even utilizing the [employer] service for translation, it was difficult following her history.  The reason listed for purpose of her visit on the schedule is "screening."  But apparently she had a colonoscopy with Dr. Valdivia in 2019 (see a progress note from him dated 11/5/2019, describing a colonoscopy being scheduled.  I do not see the actual colonoscopy report.).    But as best I can tell, her main complaint today may be dysphagia, or odynophagia/sore throat, possibly LPR.  She reports that hx began back around Feb., when she was dx'd with pneumonia.  And then she was dx'd with Candida.    Review of the Epic notes shows an ER visit  2/26/2020 when she was dx'd with "pneumonia" and was positive for flu.  She was rx'd Tamiflu.  There was another ER visit 5/5/2020 when she was checked for mono and strep, and then was referred to ENT.  And there's a clinic note from Dr. Gonzales 9/23/2020:  "Mandi Reveles is here for sore throat.   Length of symptoms: 6 mos. She had PNA in Feb, and since that time, she has had intermittent pain. She feels discomfort below her oral cavity in her "glands." She had a dry sensation which preceded this. Throbbing pain, described as "about to have a throat infection." No specific time of day worse. She denies pain within her throat. She has tenderness with palpation of her submandibular region, symmetric and bilateral. She points to her submental region as the pain but describes it as roof of mouth, oral cavity pain. 3 glasses of water per day. 3-4 big cups of coffee per day.    She has GERD and takes PPI prn.   Assessment:    1. Sialadenitis   2. Chronic sore throat    Plan:   Suspect mild chronic sialadenitis based on location of symptoms  We discussed decr coffee, improving hydration, massage / " "warm compresses  Amoxicillin x 14 days  FU if persistent, consider imaging         After the visit, I ws able to review the media section of the chart, where I found several notes from Dr. Abad (the ENT that she was referred to after ER Visit 5/5/2020).  5/18/2020:   He reported she was there for "chronic throat pain, hurting since Feb.  Told had pneumonia, and take Tamiflu.   He dx'd her Acute Tonsillitis and Candidiasis.  He rx'd Amoxicillin 875 mg BID for 10 days and Nystatin 1 mill units QID for 10 days.  7/8/2020:  Another note , and reports that she finished the Clindamycin, and stopped using the Peridex Mouthwash.  She had a CT of the sinuses which showed no sinusitis.  His exam noted the oropharynx with "no lesions, no exudate."   He rx'd Carafate susp 10 ml TID for 14 days and Imitrex 50 mg (20 tabs, 1 q 2 hrs, for 2 days (?)).          See last Colonoscopy at [facility] 3/2/2016:  Indications:         Surveillance: Personal history of piecemeal removal                        of large sessile adenoma on last colonoscopy (less                        than 6 months ago), Last colonoscopy: October 2015   Impression:  - Non-bleeding internal hemorrhoids.                        - A tattoo was seen in the cecum. A post-polypectomy                        scar was found at the tattoo site. There was no                        evidence of residual polyp tissue.                        - The rectum, sigmoid colon, descending colon,                        transverse colon, ascending colon, appendiceal                        orifice and ileocecal valve are normal.                        - The examined portion of the ileum was normal.                        - No specimens collected.   Recommendation:      - High fiber diet.                        - Continue present medications.                        - Repeat colonoscopy in 3 years for surveillance.                        - Discharge patient to home (ambulatory).          "               - Return to my office PRN.   Manohar Shine MD   3/2/2016       Colonoscopy 10/19/2015:  Impression:  - Non-bleeding internal hemorrhoids.                        - One 3 mm polyp in the cecum. Resected and                        retrieved.                        - One 11 mm polyp in the cecum. Resected and                        retrieved. Tattooed.                        - The rectum, sigmoid colon, descending colon,                        transverse colon, ascending colon, appendiceal                        orifice and ileocecal valve are normal.                        - The examined portion of the ileum was normal.   Recommendation:      - High fiber diet.                        - Continue present medications.                        - Await pathology results.                        - Repeat colonoscopy in 3 months for surveillance                        after piecemeal polypectomy.                        - Discharge patient to home (ambulatory).                        - Return to my office PRN.   Manohar Shine MD   10/19/2015     SPECIMEN  1) Cecum Polyps  FINAL PATHOLOGIC DIAGNOSIS  LARGE INTESTINE, CECUM, ENDOSCOPIC POLYPECTOMIES:  TUBULAR ADENOMA FRAGMENTS.      Review of Systems   Constitutional: Negative for activity change, appetite change, fatigue, fever and unexpected weight change.   HENT: Positive for sore throat and trouble swallowing (Odynophagia(?)). Negative for dental problem, drooling, mouth sores and voice change.    Eyes: Negative.    Respiratory: Negative for cough, choking, shortness of breath, wheezing and stridor.    Cardiovascular: Negative for chest pain.   Gastrointestinal: Negative for abdominal distention, abdominal pain, anal bleeding, blood in stool, constipation, diarrhea, nausea, rectal pain and vomiting.   Genitourinary: Negative.    Musculoskeletal: Negative for arthralgias, joint swelling, myalgias and neck stiffness.   Integumentary:  Negative for color change  and rash.   Neurological: Negative for weakness.   Hematological: Negative for adenopathy. Does not bruise/bleed easily.   Psychiatric/Behavioral: Negative for agitation, behavioral problems, confusion, decreased concentration and dysphoric mood.         Objective:      Physical Exam  Constitutional:       General: She is not in acute distress.     Appearance: She is well-developed.   HENT:      Head: Normocephalic.      Nose: Nose normal.      Mouth/Throat:      Pharynx: No oropharyngeal exudate.   Eyes:      General: No scleral icterus.     Conjunctiva/sclera: Conjunctivae normal.   Neck:      Musculoskeletal: Neck supple. No muscular tenderness.      Thyroid: No thyromegaly.      Trachea: No tracheal deviation.   Cardiovascular:      Rate and Rhythm: Normal rate and regular rhythm.      Heart sounds: Normal heart sounds. No murmur. No gallop.    Pulmonary:      Effort: Pulmonary effort is normal.      Breath sounds: Normal breath sounds. No wheezing or rales.   Abdominal:      General: Bowel sounds are normal. There is no distension.      Palpations: Abdomen is soft. There is no mass.      Tenderness: There is no abdominal tenderness. There is no guarding.   Musculoskeletal: Normal range of motion.   Lymphadenopathy:      Cervical: No cervical adenopathy.   Skin:     General: Skin is warm and dry.      Findings: No erythema or rash.   Neurological:      Mental Status: She is alert and oriented to person, place, and time.   Psychiatric:         Behavior: Behavior normal.         Assessment:         Sore throat  -     Ambulatory referral/consult to Gastroenterology    Odynophagia    Gastroesophageal reflux disease, unspecified whether esophagitis present      Plan:        1. Sched for EGD, looking for evidence of Candida, to get a feel for her degree of reflux, etc.   2. If EGD neg, will get CT of neck/oropharynx.

## 2020-11-20 ENCOUNTER — TELEPHONE (OUTPATIENT)
Dept: PODIATRY | Facility: CLINIC | Age: 65
End: 2020-11-20

## 2020-11-20 NOTE — TELEPHONE ENCOUNTER
----- Message from Bernie Martino sent at 11/20/2020  3:16 AM CST -----  Regarding: Lab Client Services  Contact: [phone]  Good Morning,    My name is Bernie Martino I work in the Lab Client Services. We had a problem with some lab work on this patient. If someone from your office could call us at [phone] or ext. [phone] that would be great. Anyone in my department can help.     Thank you

## 2020-11-21 ENCOUNTER — LAB VISIT (OUTPATIENT)
Dept: FAMILY MEDICINE | Facility: CLINIC | Age: 65
End: 2020-11-21
Payer: MEDICARE

## 2020-11-21 DIAGNOSIS — Z01.812 PRE-PROCEDURE LAB EXAM: ICD-10-CM

## 2020-11-21 PROCEDURE — U0003 INFECTIOUS AGENT DETECTION BY NUCLEIC ACID (DNA OR RNA); SEVERE ACUTE RESPIRATORY SYNDROME CORONAVIRUS 2 (SARS-COV-2) (CORONAVIRUS DISEASE [COVID-19]), AMPLIFIED PROBE TECHNIQUE, MAKING USE OF HIGH THROUGHPUT TECHNOLOGIES AS DESCRIBED BY CMS-2020-01-R: HCPCS

## 2020-11-22 LAB — SARS-COV-2 RNA RESP QL NAA+PROBE: NOT DETECTED

## 2020-11-23 ENCOUNTER — ANESTHESIA EVENT (OUTPATIENT)
Dept: ENDOSCOPY | Facility: HOSPITAL | Age: 65
End: 2020-11-23
Payer: MEDICARE

## 2020-11-24 ENCOUNTER — HOSPITAL ENCOUNTER (OUTPATIENT)
Facility: HOSPITAL | Age: 65
Discharge: HOME | End: 2020-11-24
Attending: INTERNAL MEDICINE | Admitting: INTERNAL MEDICINE
Payer: MEDICARE

## 2020-11-24 ENCOUNTER — ANESTHESIA (OUTPATIENT)
Dept: ENDOSCOPY | Facility: HOSPITAL | Age: 65
End: 2020-11-24
Payer: MEDICARE

## 2020-11-24 DIAGNOSIS — R13.10 DYSPHAGIA: ICD-10-CM

## 2020-11-24 LAB — H PYLORI INDEX VALUE: NEGATIVE

## 2020-11-24 PROCEDURE — D9220A PRA ANESTHESIA: ICD-10-PCS | Mod: ANES,,, | Performed by: ANESTHESIOLOGY

## 2020-11-24 PROCEDURE — 37000009 HC ANESTHESIA EA ADD 15 MINS: Mod: PO | Performed by: INTERNAL MEDICINE

## 2020-11-24 PROCEDURE — 88305 TISSUE EXAM BY PATHOLOGIST: ICD-10-PCS | Mod: 26,,, | Performed by: PATHOLOGY

## 2020-11-24 PROCEDURE — 37000008 HC ANESTHESIA 1ST 15 MINUTES: Mod: PO | Performed by: INTERNAL MEDICINE

## 2020-11-24 PROCEDURE — D9220A PRA ANESTHESIA: Mod: CRNA,,, | Performed by: NURSE ANESTHETIST, CERTIFIED REGISTERED

## 2020-11-24 PROCEDURE — 88305 TISSUE EXAM BY PATHOLOGIST: CPT | Mod: 26,,, | Performed by: PATHOLOGY

## 2020-11-24 PROCEDURE — 43248 EGD GUIDE WIRE INSERTION: CPT | Mod: PO | Performed by: INTERNAL MEDICINE

## 2020-11-24 PROCEDURE — 43248 EGD GUIDE WIRE INSERTION: CPT | Mod: ,,, | Performed by: INTERNAL MEDICINE

## 2020-11-24 PROCEDURE — D9220A PRA ANESTHESIA: Mod: ANES,,, | Performed by: ANESTHESIOLOGY

## 2020-11-24 PROCEDURE — 43239 PR EGD, FLEX, W/BIOPSY, SGL/MULTI: ICD-10-PCS | Mod: 59,,, | Performed by: INTERNAL MEDICINE

## 2020-11-24 PROCEDURE — 88305 TISSUE EXAM BY PATHOLOGIST: CPT | Mod: 59 | Performed by: PATHOLOGY

## 2020-11-24 PROCEDURE — 27201012 HC FORCEPS, HOT/COLD, DISP: Mod: PO | Performed by: INTERNAL MEDICINE

## 2020-11-24 PROCEDURE — D9220A PRA ANESTHESIA: ICD-10-PCS | Mod: CRNA,,, | Performed by: NURSE ANESTHETIST, CERTIFIED REGISTERED

## 2020-11-24 PROCEDURE — 43239 EGD BIOPSY SINGLE/MULTIPLE: CPT | Mod: PO,59 | Performed by: INTERNAL MEDICINE

## 2020-11-24 PROCEDURE — 63600175 PHARM REV CODE 636 W HCPCS: Mod: PO | Performed by: INTERNAL MEDICINE

## 2020-11-24 PROCEDURE — 25000003 PHARM REV CODE 250: Mod: PO | Performed by: NURSE ANESTHETIST, CERTIFIED REGISTERED

## 2020-11-24 PROCEDURE — 87449 NOS EACH ORGANISM AG IA: CPT | Mod: PO | Performed by: INTERNAL MEDICINE

## 2020-11-24 PROCEDURE — 43248 PR EGD, FLEX, W/DILATION OVER GUIDEWIRE: ICD-10-PCS | Mod: ,,, | Performed by: INTERNAL MEDICINE

## 2020-11-24 PROCEDURE — 43239 EGD BIOPSY SINGLE/MULTIPLE: CPT | Mod: 59,,, | Performed by: INTERNAL MEDICINE

## 2020-11-24 PROCEDURE — 63600175 PHARM REV CODE 636 W HCPCS: Mod: PO | Performed by: NURSE ANESTHETIST, CERTIFIED REGISTERED

## 2020-11-24 RX ORDER — SODIUM CHLORIDE, SODIUM LACTATE, POTASSIUM CHLORIDE, CALCIUM CHLORIDE 600; 310; 30; 20 MG/100ML; MG/100ML; MG/100ML; MG/100ML
INJECTION, SOLUTION INTRAVENOUS CONTINUOUS
Status: DISCONTINUED | OUTPATIENT
Start: 2020-11-24 | End: 2020-11-24 | Stop reason: HOSPADM

## 2020-11-24 RX ORDER — ESOMEPRAZOLE MAGNESIUM 40 MG/1
40 CAPSULE, DELAYED RELEASE ORAL
Qty: 60 CAPSULE | Refills: 5 | Status: SHIPPED | OUTPATIENT
Start: 2020-11-24 | End: 2021-06-21 | Stop reason: SDUPTHER

## 2020-11-24 RX ORDER — PROPOFOL 10 MG/ML
VIAL (ML) INTRAVENOUS
Status: DISCONTINUED | OUTPATIENT
Start: 2020-11-24 | End: 2020-11-24

## 2020-11-24 RX ORDER — FAMOTIDINE 40 MG/1
40 TABLET, FILM COATED ORAL NIGHTLY
Qty: 60 TABLET | Refills: 5 | Status: SHIPPED | OUTPATIENT
Start: 2020-11-24 | End: 2020-12-08

## 2020-11-24 RX ORDER — SUCRALFATE 1 G/1
1 TABLET ORAL
Qty: 60 TABLET | Refills: 2 | Status: SHIPPED | OUTPATIENT
Start: 2020-11-24 | End: 2021-08-26 | Stop reason: SDUPTHER

## 2020-11-24 RX ORDER — PROPOFOL 10 MG/ML
VIAL (ML) INTRAVENOUS CONTINUOUS PRN
Status: DISCONTINUED | OUTPATIENT
Start: 2020-11-24 | End: 2020-11-24

## 2020-11-24 RX ORDER — LIDOCAINE HCL/PF 100 MG/5ML
SYRINGE (ML) INTRAVENOUS
Status: DISCONTINUED | OUTPATIENT
Start: 2020-11-24 | End: 2020-11-24

## 2020-11-24 RX ORDER — SODIUM CHLORIDE 0.9 % (FLUSH) 0.9 %
10 SYRINGE (ML) INJECTION
Status: DISCONTINUED | OUTPATIENT
Start: 2020-11-24 | End: 2020-11-24 | Stop reason: HOSPADM

## 2020-11-24 RX ADMIN — LIDOCAINE HYDROCHLORIDE 100 MG: 20 INJECTION, SOLUTION INTRAVENOUS at 08:11

## 2020-11-24 RX ADMIN — SODIUM CHLORIDE, SODIUM LACTATE, POTASSIUM CHLORIDE, AND CALCIUM CHLORIDE: .6; .31; .03; .02 INJECTION, SOLUTION INTRAVENOUS at 07:11

## 2020-11-24 RX ADMIN — PROPOFOL 100 MCG/KG/MIN: 10 INJECTION, EMULSION INTRAVENOUS at 08:11

## 2020-11-24 RX ADMIN — PROPOFOL 100 MG: 10 INJECTION, EMULSION INTRAVENOUS at 08:11

## 2020-11-24 NOTE — BRIEF OP NOTE
Discharge Note  Short Stay      SUMMARY     Admit Date: 11/24/2020    Attending Physician: Anthony Lee Jr., MD     Discharge Physician: Anthony Lee Jr., MD    Discharge Date: 11/24/2020 9:33 AM    Final Diagnosis: Dysphagia, unspecified type [R13.10]  Impression:          - Arytenoid erythema was found.                        - Normal cricopharyngeus.                        - Normal upper third of esophagus. Biopsied.                        - Normal middle third of esophagus.                        - Reflux esophagitis. Biopsied.                        - Z-line regular, 32-33 cm from the incisors.                        - Patulous lower esophageal sphincter.                        - No endoscopic esophageal abnormality to explain                        patient's dysphagia. Esophagus dilated, 51 Fr.                        - Small hiatal hernia.                        - Normal gastric fundus and gastric body.                        - Minimal antritis. Biopsied.                        - Normal pylorus.                        - Normal examined duodenum.                        - Normal major papilla.   Recommendation:      - Discharge patient to home.                        - Await pathology and CLOtest results.                        - Follow an antireflux regimen.                        - Use Nexium (esomeprazole) 40 mg PO daily.                        - Use Pepcid (famotidine) 40 mg PO daily.                        - Use sucralfate tablets 1 gram PO QID for 2 weeks.                        - If symptoms jacey to improve, perform CT scan                        (computed tomography) of the mouth and neck with                        contrast.                        - Call the G.I. clinic in 2 weeks for reports (if                        you haven't heard from us sooner) [phone].                        - Return to GI clinic in 4-6 weeks.   Anthony Lee MD   11/24/2020      Disposition: HOME OR SELF  CARE    Patient Instructions:   Current Discharge Medication List      START taking these medications    Details   famotidine (PEPCID) 40 MG tablet Take 1 tablet (40 mg total) by mouth every evening.  Qty: 60 tablet, Refills: 5      sucralfate (CARAFATE) 1 gram tablet Take 1 tablet (1 g total) by mouth 4 (four) times daily before meals and nightly. Let dissolve in a teaspoon of water first.  Qty: 60 tablet, Refills: 2         CONTINUE these medications which have CHANGED    Details   esomeprazole (NEXIUM) 40 MG capsule Take 1 capsule (40 mg total) by mouth before breakfast.  Qty: 60 capsule, Refills: 5         CONTINUE these medications which have NOT CHANGED    Details   acetaminophen (TYLENOL) 500 MG tablet Take 500 mg by mouth every 6 (six) hours as needed for Pain.      gabapentin (NEURONTIN) 300 MG capsule Take 300 mg by mouth 3 (three) times daily.       ibuprofen (ADVIL,MOTRIN) 200 MG tablet Take 200 mg by mouth every 6 (six) hours as needed for Pain.      montelukast (SINGULAIR) 10 mg tablet Take 1 tablet (10 mg total) by mouth every evening.  Qty:      Associated Diagnoses: Mild intermittent asthma without complication      terbinafine HCL (LAMISIL) 250 mg tablet Take 1 tablet (250 mg total) by mouth once daily.  Qty: 90 tablet, Refills: 0    Associated Diagnoses: Fungal infection of nail; Onychomycosis due to dermatophyte      VENTOLIN HFA 90 mcg/actuation inhaler INHALE 1-2 PUFFS EVERY 4 HOURS IF NEEDED FOR WHEEZING  Qty: 18 g, Refills: 3    Associated Diagnoses: Mild intermittent asthma without complication             Discharge Procedure Orders (must include Diet, Follow-up, Activity)    Follow Up:  Follow up with PCP as per your routine.  Please follow an anti reflux diet.  Activity as tolerated.    No driving day of procedure.

## 2020-11-24 NOTE — [employer] PATIENT INSTRUCTIONS
Discharge Summary/Instructions after an Endoscopic Procedure  Patient Name: Mandi Cline  Patient MRN: [id number]  Patient YOB: 1955 Tuesday, November 24, 2020  Anthony Lee MD  RESTRICTIONS:  During your procedure today, you received medications for sedation.  These   medications may affect your judgment, balance and coordination.  Therefore,   for 24 hours, you have the following restrictions:   - DO NOT drive a car, operate machinery, make legal/financial decisions,   sign important papers or drink alcohol.    ACTIVITY:  Today: no heavy lifting, straining or running due to procedural   sedation/anesthesia.  The following day: return to full activity including work.  DIET:  Eat and drink normally unless instructed otherwise.     TREATMENT FOR COMMON SIDE EFFECTS:  - Mild abdominal pain, nausea, belching, bloating or excessive gas:  rest,   eat lightly and use a heating pad.  - Sore Throat: treat with throat lozenges and/or gargle with warm salt   water.  - Because air was used during the procedure, expelling large amounts of air   from your rectum or belching is normal.  - If a bowel prep was taken, you may not have a bowel movement for 1-3 days.    This is normal.  SYMPTOMS TO WATCH FOR AND REPORT TO YOUR PHYSICIAN:  1. Abdominal pain or bloating, other than gas cramps.  2. Chest pain.  3. Back pain.  4. Signs of infection such as: chills or fever occurring within 24 hours   after the procedure.  5. Rectal bleeding, which would show as bright red, maroon, or black stools.   (A tablespoon of blood from the rectum is not serious, especially if   hemorrhoids are present.)  6. Vomiting.  7. Weakness or dizziness.  GO DIRECTLY TO THE NEAREST EMERGENCY ROOM IF YOU HAVE ANY OF THE FOLLOWING:      Difficulty breathing              Chills and/or fever over 101 F   Persistent vomiting and/or vomiting blood   Severe abdominal pain   Severe chest pain   Black, tarry stools   Bleeding- more than  one tablespoon   Any other symptom or condition that you feel may need urgent attention  Your doctor recommends these additional instructions:  If any biopsies were taken, your doctors clinic will contact you in 1 to 2   weeks with any results.  Follow an antireflux regimen.  This includes:       - Do not lie down for at least 3 to 4 hours after meals.        - Raise the head of the bed 4 to 6 inches.        - Decrease excess weight.        - Avoid citrus juices and other acidic foods, alcohol, chocolate, mints,   coffee and other caffeinated beverages, carbonated beverages, fatty and   fried foods.        - Avoid tight-fitting clothing.        - Avoid cigarettes and other tobacco products.   Take Nexium (esomeprazole) 40 mg by mouth once a day.   Take Pepcid (famotidine) 40 mg by mouth every night.   Take Carafate (sucralfate) tablets 1 gram by mouth four times a day for two   weeks.   Return to  GI clinic in 4-6 weeks.  For questions, problems or results please call your physician - Anthony Lee MD at Work:  [phone].  EMERGENCY PHONE NUMBER: [phone], LAB RESULTS: [phone]  IF A COMPLICATION OR EMERGENCY SITUATION ARISES AND YOU ARE UNABLE TO REACH   YOUR PHYSICIAN - GO DIRECTLY TO THE EMERGENCY ROOM.  ___________________________________________  Nurse Signature  ___________________________________________  Patient/Designated Responsible Party Signature  Anthony Lee MD  11/24/2020 9:29:02 AM  This report has been verified and signed electronically.  [employer]

## 2020-11-24 NOTE — ANESTHESIA PREPROCEDURE EVALUATION
11/24/2020  Mandi Cline is a 65 y.o., female.    Anesthesia Evaluation    I have reviewed the Patient Summary Reports.    I have reviewed the Nursing Notes.    I have reviewed the Medications.     Review of Systems  Anesthesia Hx:  No problems with previous Anesthesia    Social:  Former Smoker    Hematology/Oncology:        Hematology Comments: DVT of lower extremity   --  Cancer in past history:  Breast left   Cardiovascular:  Cardiovascular Normal     Pulmonary:   Asthma asymptomatic and mild    Renal/:  Renal/ Normal     Hepatic/GI:   GERD, well controlled    Musculoskeletal:   Arthritis     Neurological:  Neurology Normal    Endocrine:  Endocrine Normal        Physical Exam  General:  Well nourished    Airway/Jaw/Neck:  Airway Findings: Mouth Opening: Normal Tongue: Normal  General Airway Assessment: Adult  Oropharynx Findings:  Mallampati: II  Jaw/Neck Findings:  Neck ROM: Normal ROM     Eyes/Ears/Nose:  Eyes/Ears/Nose Findings:    Dental:  Dental Findings:   Chest/Lungs:  Chest/Lungs Findings: Normal Respiratory Rate     Heart/Vascular:  Heart Findings: Rate: Normal  Rhythm: Regular Rhythm        Mental Status:  Mental Status Findings:  Cooperative, Alert and Oriented         Anesthesia Plan  Type of Anesthesia, risks & benefits discussed:  Anesthesia Type:  general  Patient's Preference:   Intra-op Monitoring Plan: standard ASA monitors  Intra-op Monitoring Plan Comments:   Post Op Pain Control Plan: multimodal analgesia  Post Op Pain Control Plan Comments:   Induction:   IV  Beta Blocker:  Patient is not currently on a Beta-Blocker (No further documentation required).       Informed Consent: Patient understands risks and agrees with Anesthesia plan.  Questions answered. Anesthesia consent signed with patient.  ASA Score: 2     Day of Surgery Review of History & Physical:        Anesthesia  Plan Notes: Pleasant 64 y/o lady tells me this is her 3rd endoscopy and has no questions.        Ready For Surgery From Anesthesia Perspective.

## 2020-11-24 NOTE — H&P
"History & Physical - Short Stay  Gastroenterology      SUBJECTIVE:     Procedure: Gastroscopy    Chief Complaint/Indication for Procedure:  Dysphagia.    History of Present Illness:  Office Visit    10/20/2020  [place] - Gastroenterology     Anthony Lee Jr., MD  Gastroenterology  Sore throat +2 more  Dx  Colonoscopy , Dysphagia ; Referred by Sendy Valdez MD  Reason for Visit   Progress Notes  Anthony Lee Jr., MD (Physician)   Gastroenterology   10/20/2020  4:30 PM   Signed     Subjective:       Patient ID: Mandi Cline is a 64 y.o. female.     Chief Complaint: No chief complaint on file.     This is my first encounter with Ms. Cline, who is from [place].  And even utilizing the [employer] service for translation, it was difficult following her history.  The reason listed for purpose of her visit on the schedule is "screening."  But apparently she had a colonoscopy with Dr. Valdivia in 2019 (see a progress note from him dated 11/5/2019, describing a colonoscopy being scheduled.  I do not see the actual colonoscopy report.).     But as best I can tell, her main complaint today may be dysphagia, or odynophagia/sore throat, possibly LPR.  She reports that hx began back around Feb., when she was dx'd with pneumonia.  And then she was dx'd with Candida.     Review of the Epic notes shows an ER visit  2/26/2020 when she was dx'd with "pneumonia" and was positive for flu.  She was rx'd Tamiflu.  There was another ER visit 5/5/2020 when she was checked for mono and strep, and then was referred to ENT.  And there's a clinic note from Dr. Gonzales 9/23/2020:  "Mandi Reveles is here for sore throat.   Length of symptoms: 6 mos. She had PNA in Feb, and since that time, she has had intermittent pain. She feels discomfort below her oral cavity in her "glands." She had a dry sensation which preceded this. Throbbing pain, described as "about to have a throat infection." No specific time of day worse. " "She denies pain within her throat. She has tenderness with palpation of her submandibular region, symmetric and bilateral. She points to her submental region as the pain but describes it as roof of mouth, oral cavity pain. 3 glasses of water per day. 3-4 big cups of coffee per day.    She has GERD and takes PPI prn.   Assessment:    1.         Sialadenitis   2.         Chronic sore throat    Plan:   Suspect mild chronic sialadenitis based on location of symptoms  We discussed decr coffee, improving hydration, massage / warm compresses  Amoxicillin x 14 days  FU if persistent, consider imaging           After the visit, I ws able to review the media section of the chart, where I found several notes from Dr. Abad (the ENT that she was referred to after ER Visit 5/5/2020).  5/18/2020:   He reported she was there for "chronic throat pain, hurting since Feb.  Told had pneumonia, and take Tamiflu.   He dx'd her Acute Tonsillitis and Candidiasis.  He rx'd Amoxicillin 875 mg BID for 10 days and Nystatin 1 mill units QID for 10 days.  7/8/2020:  Another note , and reports that she finished the Clindamycin, and stopped using the Peridex Mouthwash.  She had a CT of the sinuses which showed no sinusitis.  His exam noted the oropharynx with "no lesions, no exudate."   He rx'd Carafate susp 10 ml TID for 14 days and Imitrex 50 mg (20 tabs, 1 q 2 hrs, for 2 days (?)).              See last Colonoscopy at [facility] 3/2/2016:  Indications:         Surveillance: Personal history of piecemeal removal                        of large sessile adenoma on last colonoscopy (less                        than 6 months ago), Last colonoscopy: October 2015   Impression:  - Non-bleeding internal hemorrhoids.                        - A tattoo was seen in the cecum. A post-polypectomy                        scar was found at the tattoo site. There was no                        evidence of residual polyp tissue.                        - The " rectum, sigmoid colon, descending colon,                        transverse colon, ascending colon, appendiceal                        orifice and ileocecal valve are normal.                        - The examined portion of the ileum was normal.                        - No specimens collected.   Recommendation:      - High fiber diet.                        - Continue present medications.                        - Repeat colonoscopy in 3 years for surveillance.                        - Discharge patient to home (ambulatory).                        - Return to my office PRN.   Manohar Shien MD   3/2/2016         Colonoscopy 10/19/2015:  Impression:  - Non-bleeding internal hemorrhoids.                        - One 3 mm polyp in the cecum. Resected and                        retrieved.                        - One 11 mm polyp in the cecum. Resected and                        retrieved. Tattooed.                        - The rectum, sigmoid colon, descending colon,                        transverse colon, ascending colon, appendiceal                        orifice and ileocecal valve are normal.                        - The examined portion of the ileum was normal.   Recommendation:      - High fiber diet.                        - Continue present medications.                        - Await pathology results.                        - Repeat colonoscopy in 3 months for surveillance                        after piecemeal polypectomy.                        - Discharge patient to home (ambulatory).                        - Return to my office PRN.   Manohar Shine MD   10/19/2015      SPECIMEN  1) Cecum Polyps  FINAL PATHOLOGIC DIAGNOSIS  LARGE INTESTINE, CECUM, ENDOSCOPIC POLYPECTOMIES:  TUBULAR ADENOMA FRAGMENTS.    Assessment:         Sore throat  -     Ambulatory referral/consult to Gastroenterology     Odynophagia     Gastroesophageal reflux disease, unspecified whether esophagitis present        Plan:        1.  Sched for EGD, looking for evidence of Candida, to get a feel for her degree of reflux, etc.   2. If EGD neg, will get CT of neck/oropharynx.               PTA Medications   Medication Sig    acetaminophen (TYLENOL) 500 MG tablet Take 500 mg by mouth every 6 (six) hours as needed for Pain.    esomeprazole (NEXIUM) 20 MG capsule TAKE 1 CAPSULE BY MOUTH EVERY DAY    gabapentin (NEURONTIN) 300 MG capsule Take 300 mg by mouth 3 (three) times daily.     ibuprofen (ADVIL,MOTRIN) 200 MG tablet Take 200 mg by mouth every 6 (six) hours as needed for Pain.    montelukast (SINGULAIR) 10 mg tablet Take 1 tablet (10 mg total) by mouth every evening.    terbinafine HCL (LAMISIL) 250 mg tablet Take 1 tablet (250 mg total) by mouth once daily.    VENTOLIN HFA 90 mcg/actuation inhaler INHALE 1-2 PUFFS EVERY 4 HOURS IF NEEDED FOR WHEEZING       Review of patient's allergies indicates:   Allergen Reactions    Adhesive tape-silicones Itching     Pt had reaction to adhesive tape, states burned really bad and itched        Past Medical History:   Diagnosis Date    Asthma due to seasonal allergies     seasonal, last attack was 5 years ago.    Back pain     Bronchitis     usually has once a year    GERD (gastroesophageal reflux disease)     H/O total knee replacement, right     History of breast cancer, left breast, remission     Dx 2015; remission since 2016; s/p 3 surgeries in breast and radiation.    History of DVT of lower extremity     in her younger years    Osteoarthritis of both knees     knees    Osteopenia     Prediabetes     Venous reflux     Wears glasses      Past Surgical History:   Procedure Laterality Date    BREAST BIOPSY Left     2015    BREAST LUMPECTOMY  9-29-15    BREAST SURGERY  9/17/2015    left biopsy    CERVICAL CONIZATION   W/ LASER      CHOLECYSTECTOMY      COLONOSCOPY N/A 10/19/2015    Procedure: COLONOSCOPY;  Surgeon: Manohar Woodruff MD;  Location: [facility];  Service: Endoscopy;   Laterality: N/A;    COLONOSCOPY N/A 3/2/2016    Procedure: COLONOSCOPY;  Surgeon: Manohar Woodruff MD;  Location[facility];  Service: Endoscopy;  Laterality: N/A;    HYSTERECTOMY      total hysterectomy for benign reasons    radidation Left     2016     Family History   Problem Relation Age of Onset    Diabetes Mother     Cancer Mother         pelvic    Birth defects Maternal Grandmother     Birth defects Paternal Grandfather     Alzheimer's disease Father     Diabetes Sister     No Known Problems Brother     No Known Problems Maternal Grandfather     No Known Problems Paternal Grandmother     No Known Problems Brother     No Known Problems Brother     No Known Problems Son     No Known Problems Son     No Known Problems Daughter     No Known Problems Daughter     No Known Problems Daughter      Social History     Tobacco Use    Smoking status: Former Smoker     Packs/day: 0.25     Years: 2.00     Pack years: 0.50     Types: Cigarettes     Quit date: 12/22/2004     Years since quitting: 15.9    Smokeless tobacco: Never Used   Substance Use Topics    Alcohol use: No    Drug use: No         OBJECTIVE:     Vital Signs (Most Recent)  Temp: 97.9 °F (36.6 °C) (11/24/20 0715)  Pulse: 64 (11/24/20 0715)  Resp: 15 (11/24/20 0715)  BP: 133/63 (11/24/20 0715)  SpO2: 98 % (11/24/20 0715)    Physical Exam:  : Ht 5' (1.524 m)   Wt 74.8 kg (164 lb 14.5 oz)   BMI 32.21 kg/m²                                                       GENERAL:  Comfortable, in no acute distress.                                 HEENT EXAM:  Nonicteric.  No adenopathy.  Oropharynx is clear.               NECK:  Supple.                                                               LUNGS:  Clear.                                                               CARDIAC:  Regular rate and rhythm.  S1, S2.  No murmur.                      ABDOMEN:  Obese.  Soft, positive bowel sounds, nontender.  No hepatosplenomegaly or masses.  No rebound  or guarding.                                             EXTREMITIES:  No edema.     MENTAL STATUS:  Alert and oriented.    ASSESSMENT/PLAN:     Assessment: Dysphagia.    Plan: Gastroscopy    Anesthesia Plan:   MAC / General Anaesthesia    ASA Grade: ASA 2 - Patient with mild systemic disease with no functional limitations    MALLAMPATI SCORE: II (hard and soft palate, upper portion of tonsils anduvula visible)

## 2020-11-24 NOTE — DISCHARGE INSTRUCTIONS
Recovery After Procedural Sedation (Adult)   You have been given medicine by vein to make you sleep during your surgery. This may have included both a pain medicine and sleeping medicine. Most of the effects have worn off. But you may still have some drowsiness for the next 6 to 8 hours.  Home care  Follow these guidelines when you get home:  · For the next 8 hours, you should be watched by a responsible adult. This person should make sure your condition is not getting worse.  · Don't drink any alcohol for the next 24 hours.  · Don't drive, operate dangerous machinery, or make important business or personal decisions during the next 24 hours.  · To prevent injury or falls, use caution when standing and walking for at least 24 hours after your procedure.  Note: Your healthcare provider may tell you not to take any medicine by mouth for pain or sleep in the next 4 hours. These medicines may react with the medicines you were given in the hospital. This could cause a much stronger response than usual.  Follow-up care  Follow up with your healthcare provider if you are not alert and back to your usual level of activity within 12 hours.  When to seek medical advice  Call your healthcare provider right away if any of these occur:  · Drowsiness gets worse  · Weakness or dizziness gets worse  · Repeated vomiting  · You can't be awakened  · Fever  · New rash  [employer] last reviewed this educational content on 9/1/2019  © 8782-3032 [employer]. [address] [place] [address]. All rights reserved. This information is not intended as a substitute for professional medical care. Always follow your healthcare professional's instructions.        Tips to Control Acid Reflux    To control acid reflux, youll need to make some basic diet and lifestyle changes. The simple steps outlined below may be all youll need to ease discomfort.  Watch what you eat  · Avoid fatty foods and spicy foods.  · Eat fewer  acidic foods, such as citrus and tomato-based foods. These can increase symptoms.  · Limit drinking alcohol, caffeine, and fizzy beverages. All increase acid reflux.  · Try limiting chocolate, peppermint, and spearmint. These can worsen acid reflux in some people.  Watch when you eat  · Avoid lying down for 3 hours after eating.  · Do not snack before going to bed.  Raise your head  Raising your head and upper body by 4 to 6 inches helps limit reflux when youre lying down. Put blocks under the head of your bed frame to raise it.  Other changes  · Lose weight, if you need to  · Dont exercise near bedtime  · Avoid tight-fitting clothes  · Limit aspirin and ibuprofen  · Stop smoking   Date Last Reviewed: 7/1/2016  © 5575-2919 [employer]. [address] [place] [address], [place] [address]. All rights reserved. This information is not intended as a substitute for professional medical care. Always follow your healthcare professional's instructions.

## 2020-11-24 NOTE — ANESTHESIA POSTPROCEDURE EVALUATION
Anesthesia Post Evaluation    Patient: Mandi Cline    Procedure(s) Performed: Procedure(s) (LRB):  EGD (ESOPHAGOGASTRODUODENOSCOPY) (N/A)    Final Anesthesia Type: general    Patient location during evaluation: PACU  Patient participation: Yes- Able to Participate  Level of consciousness: awake and alert and oriented  Post-procedure vital signs: reviewed and stable  Pain management: adequate  Airway patency: patent    PONV status at discharge: No PONV  Anesthetic complications: no      Cardiovascular status: blood pressure returned to baseline and stable  Respiratory status: unassisted and spontaneous ventilation  Hydration status: euvolemic  Follow-up not needed.          Vitals Value Taken Time   /70 11/24/20 0920   Temp   11/24/20 1146   Pulse 72 11/24/20 0920   Resp 18 11/24/20 0920   SpO2 97 % 11/24/20 0920         Event Time   Out of Recovery 09:25:00         Pain/Velasquez Score: Velasquez Score: 10 (11/24/2020  9:20 AM)

## 2020-11-24 NOTE — TRANSFER OF CARE
Anesthesia Transfer of Care Note    Patient: Mandi Cline    Procedure(s) Performed: Procedure(s) (LRB):  EGD (ESOPHAGOGASTRODUODENOSCOPY) (N/A)    Patient location: PACU    Anesthesia Type: general    Transport from OR: Transported from OR on room air with adequate spontaneous ventilation    Post pain: adequate analgesia    Post assessment: no apparent anesthetic complications and tolerated procedure well    Post vital signs: stable    Level of consciousness: awake and sedated    Nausea/Vomiting: no nausea/vomiting    Complications: none    Transfer of care protocol was followed      Last vitals:   Visit Vitals  /63 (BP Location: Right arm, Patient Position: Lying)   Pulse 64   Temp 36.6 °C (97.9 °F) (Skin)   Resp 15   SpO2 98%   Breastfeeding No

## 2020-11-25 VITALS
OXYGEN SATURATION: 97 % | DIASTOLIC BLOOD PRESSURE: 70 MMHG | TEMPERATURE: 98 F | SYSTOLIC BLOOD PRESSURE: 154 MMHG | HEART RATE: 72 BPM | RESPIRATION RATE: 18 BRPM

## 2020-12-02 LAB
FINAL PATHOLOGIC DIAGNOSIS: NORMAL
GROSS: NORMAL
Lab: NORMAL

## 2020-12-02 NOTE — Clinical Note
RTC to see DR Durand in 6 months.  Ivermectin Counseling:  Patient instructed to take medication on an empty stomach with a full glass of water.  Patient informed of potential adverse effects including but not limited to nausea, diarrhea, dizziness, itching, and swelling of the extremities or lymph nodes.  The patient verbalized understanding of the proper use and possible adverse effects of ivermectin.  All of the patient's questions and concerns were addressed.

## 2020-12-08 ENCOUNTER — PATIENT MESSAGE (OUTPATIENT)
Dept: OTOLARYNGOLOGY | Facility: CLINIC | Age: 65
End: 2020-12-08

## 2020-12-09 ENCOUNTER — TELEPHONE (OUTPATIENT)
Dept: OTOLARYNGOLOGY | Facility: CLINIC | Age: 65
End: 2020-12-09

## 2020-12-09 NOTE — TELEPHONE ENCOUNTER
----- Message from Mack Gonzales MD sent at 12/8/2020 12:14 PM CST -----  I was contacted by pt PCP to request different appt time and date for me. She is Japanese speaking only.

## 2020-12-15 NOTE — PROGRESS NOTES
HPI    63 years old female presented to Oncology Clinic for evaluation treatment of breast cancer.  Patient was previously seen by Kasi Callaway at [facility].  She has regionally from [place] who was currently lives in [address].  Her history dated back to August 2015 when a routine screening mammogram shows calcification in the superior part of left breast.  Core needle biopsy of 2 lesions showed high-grade DCIS.  Cell strongly ER positive but only 10% will mildly NC positive.  Patient was referred to Dr. Richards and underwent initial wire localization lumpectomy on September 25, 2015.  Pathology revealed extensive solid high-grade DCIS measuring 4 cm in diameter with extensive comedonecrosis.  Due to extremely close margin patient underwent re-excision on October 20, 2015.  Analysis pathology shows positive margin subsequently patient return to OR on November 12, 2015 for further re-excision of positive margin.  Pathology from November 12, 2015 review no residual DCIS a margin is close at 0.6 mm.  Patient underwent radiation therapy to the breast.  She completed her radiation in March and she was referred to Oncology at that time for evaluation and treatment.  Adjuvant hormone therapy was against it due to prior history of DVT x2.    Interval follow-up 12/16/2020.  No complaints.    Past Medical History:   Diagnosis Date    Asthma due to seasonal allergies     seasonal, last attack was 5 years ago.    Back pain     Bronchitis     usually has once a year    GERD (gastroesophageal reflux disease)     H/O total knee replacement, right     History of breast cancer, left breast, remission     Dx 2015; remission since 2016; s/p 3 surgeries in breast and radiation.    History of DVT of lower extremity     in her younger years    Osteoarthritis of both knees     knees    Osteopenia     Prediabetes     Venous reflux     Wears glasses      Social History     Socioeconomic  History    Marital status:      Spouse name: Not on file    Number of children: Not on file    Years of education: Not on file    Highest education level: Not on file   Occupational History    Not on file   Social Needs    Financial resource strain: Not on file    Food insecurity     Worry: Not on file     Inability: Not on file    Transportation needs     Medical: Not on file     Non-medical: Not on file   Tobacco Use    Smoking status: Former Smoker     Packs/day: 0.25     Years: 2.00     Pack years: 0.50     Types: Cigarettes     Quit date: 12/22/2004     Years since quitting: 15.9    Smokeless tobacco: Never Used   Substance and Sexual Activity    Alcohol use: No    Drug use: No    Sexual activity: Not Currently     Partners: Male   Lifestyle    Physical activity     Days per week: Not on file     Minutes per session: Not on file    Stress: Not on file   Relationships    Social connections     Talks on phone: Not on file     Gets together: Not on file     Attends Congregational service: Not on file     Active member of club or organization: Not on file     Attends meetings of clubs or organizations: Not on file     Relationship status: Not on file   Other Topics Concern    Are you pregnant or think you may be? No    Breast-feeding No   Social History Narrative    Not on file       Objective    Physical Exam   Vitals:    12/16/20 1509   BP: 122/68   Pulse: 66   Resp: 18   Temp: 98 °F (36.7 °C)       Constitutional:  Alert oriented x3 no acute distress   HENT:  Normocephalic atraumatic pupils equal round reactive to light extraocular muscle intact  Neck: Trachea normal and normal range of motion. No thyromegaly   Cardiovascular:  Normal rate  Pulmonary/Chest:  Clear to auscultate   Abdominal:  Soft nontender nondistended   Musculoskeletal: Normal range of motion. Gait is normal No clubbing, cyanosis     Lymphadenopathy:  No evidence lymphadenopathy  Neurological: patient is alert and oriented  to person, place, and time. patient has normal strength and normal reflexes. No sensory deficit. Gait normal.   Skin: Skin is warm, dry and intact. No bruising, no lesion and no rash noted. No cyanosis. Nails show no clubbing.   No lesions   Psychiatric: patient has a normal mood and affect. patient speech is normal and behavior is normal. Judgment normal. Cognition and memory are normal.   Vitals reviewed.     CMP  Sodium   Date Value Ref Range Status   09/08/2020 136 136 - 145 mmol/L Final     Potassium   Date Value Ref Range Status   09/08/2020 4.9 3.5 - 5.1 mmol/L Final     Chloride   Date Value Ref Range Status   09/08/2020 102 95 - 110 mmol/L Final     CO2   Date Value Ref Range Status   09/08/2020 30 22 - 31 mmol/L Final     Glucose   Date Value Ref Range Status   09/08/2020 107 70 - 110 mg/dL Final     Comment:     The ADA recommends the following guidelines for fasting glucose:  Normal:       less than 100 mg/dL  Prediabetes:  100 mg/dL to 125 mg/dL  Diabetes:     126 mg/dL or higher       BUN   Date Value Ref Range Status   09/08/2020 23 (H) 7 - 18 mg/dL Final     Creatinine   Date Value Ref Range Status   09/08/2020 0.92 0.50 - 1.40 mg/dL Final   05/14/2013 0.9 0.5 - 1.4 mg/dL Final     Calcium   Date Value Ref Range Status   09/08/2020 9.7 8.4 - 10.2 mg/dL Final   05/14/2013 9.9 8.7 - 10.5 mg/dL Final     Total Protein   Date Value Ref Range Status   09/08/2020 7.8 6.0 - 8.4 g/dL Final     Albumin   Date Value Ref Range Status   09/08/2020 4.5 3.5 - 5.2 g/dL Final     Total Bilirubin   Date Value Ref Range Status   09/08/2020 0.5 0.2 - 1.3 mg/dL Final     Alkaline Phosphatase   Date Value Ref Range Status   09/08/2020 57 38 - 145 U/L Final   05/14/2013 63 55 - 135 U/L Final     AST   Date Value Ref Range Status   09/08/2020 24 14 - 36 U/L Final   05/14/2013 18 10 - 40 U/L Final     ALT   Date Value Ref Range Status   09/08/2020 13 0 - 35 U/L Final     Anion Gap   Date Value Ref Range Status   09/08/2020  4 (L) 8 - 16 mmol/L Final   05/14/2013 11 5 - 15 meq/L Final     eGFR if    Date Value Ref Range Status   09/08/2020 >60 >60 mL/min/1.73 m^2 Final     eGFR if non    Date Value Ref Range Status   09/08/2020 >60 >60 mL/min/1.73 m^2 Final     Comment:     Calculation used to obtain the estimated glomerular filtration  rate (eGFR) is the CKD-EPI equation.        Lab Results   Component Value Date    WBC 5.20 08/01/2019    HGB 13.9 08/01/2019    HCT 43.7 08/01/2019    MCV 95 08/01/2019     08/01/2019     Bilateral mammogram digital 03/20/2019  Left  There are post-surgical findings from a previous lumpectomy seen in the left breast. There has been no interval development of a suspicious mass, microcalcification, or architectural distortion.      Right  There is no evidence of suspicious masses, calcifications, or other abnormal findings.    Assessment    Breast CA diagnosed 2015 DCIS.  Treated with surgery and re-excision to achive  clear margin.  No adjuvant endocrine therapy was given to patient due history of osteoporosis as well as history of DVT.  > Thus far patient doing well no mammographic ultrasound evidence of malignancy (  mammogram in 06/03/2020 no evidence malignancy)  >Patient initially seen in [place] given the logistic and transportation patient wants to transfer care at [facility].  > primary care physician follow at [place]  > detailed breast exam no evidence recurrence 6/5/20  > will recheck sohail screen bilateral June 2021 with labs    Obesity Acid reflux  > primary care follow  > Continue follow-up of primary care physicians office visit and all other medical management.

## 2020-12-16 ENCOUNTER — OFFICE VISIT (OUTPATIENT)
Dept: HEMATOLOGY/ONCOLOGY | Facility: CLINIC | Age: 65
End: 2020-12-16
Payer: MEDICARE

## 2020-12-16 ENCOUNTER — LAB VISIT (OUTPATIENT)
Dept: LAB | Facility: HOSPITAL | Age: 65
End: 2020-12-16
Attending: INTERNAL MEDICINE
Payer: MEDICARE

## 2020-12-16 VITALS
HEIGHT: 60 IN | RESPIRATION RATE: 18 BRPM | SYSTOLIC BLOOD PRESSURE: 122 MMHG | BODY MASS INDEX: 32.02 KG/M2 | HEART RATE: 66 BPM | WEIGHT: 163.13 LBS | OXYGEN SATURATION: 99 % | TEMPERATURE: 98 F | DIASTOLIC BLOOD PRESSURE: 68 MMHG

## 2020-12-16 DIAGNOSIS — E66.9 OBESITY (BMI 30-39.9): ICD-10-CM

## 2020-12-16 DIAGNOSIS — Z09 ONCOLOGY FOLLOW-UP ENCOUNTER: ICD-10-CM

## 2020-12-16 DIAGNOSIS — Z86.718 HISTORY OF DVT OF LOWER EXTREMITY: ICD-10-CM

## 2020-12-16 DIAGNOSIS — K21.9 GASTROESOPHAGEAL REFLUX DISEASE: ICD-10-CM

## 2020-12-16 DIAGNOSIS — Z85.3 HISTORY OF BREAST CANCER: ICD-10-CM

## 2020-12-16 DIAGNOSIS — Z85.3 HISTORY OF BREAST CANCER: Primary | ICD-10-CM

## 2020-12-16 DIAGNOSIS — Z12.31 ENCOUNTER FOR SCREENING MAMMOGRAM FOR MALIGNANT NEOPLASM OF BREAST: ICD-10-CM

## 2020-12-16 LAB
ALBUMIN SERPL BCP-MCNC: 4.5 G/DL (ref 3.5–5.2)
ALP SERPL-CCNC: 66 U/L (ref 38–145)
ALT SERPL W/O P-5'-P-CCNC: 22 U/L (ref 0–35)
ANION GAP SERPL CALC-SCNC: 7 MMOL/L (ref 8–16)
AST SERPL-CCNC: 29 U/L (ref 14–36)
BASOPHILS # BLD AUTO: 0.06 K/UL (ref 0–0.2)
BASOPHILS NFR BLD: 0.8 % (ref 0–1.9)
BILIRUB SERPL-MCNC: 0.5 MG/DL (ref 0.2–1.3)
CALCIUM SERPL-MCNC: 9.8 MG/DL (ref 8.4–10.2)
CHLORIDE SERPL-SCNC: 99 MMOL/L (ref 95–110)
CO2 SERPL-SCNC: 34 MMOL/L (ref 22–31)
CREAT SERPL-MCNC: 0.83 MG/DL (ref 0.5–1.4)
DIFFERENTIAL METHOD: ABNORMAL
EOSINOPHIL # BLD AUTO: 0.1 K/UL (ref 0–0.5)
EOSINOPHIL NFR BLD: 1.5 % (ref 0–8)
ERYTHROCYTE [DISTWIDTH] IN BLOOD BY AUTOMATED COUNT: 14.3 % (ref 11.5–14.5)
EST. GFR  (AFRICAN AMERICAN): >60 ML/MIN/1.73 M^2
EST. GFR  (NON AFRICAN AMERICAN): >60 ML/MIN/1.73 M^2
GLUCOSE SERPL-MCNC: 110 MG/DL (ref 70–110)
HCT VFR BLD AUTO: 42.2 % (ref 37–48.5)
HGB BLD-MCNC: 13 G/DL (ref 12–16)
IMM GRANULOCYTES # BLD AUTO: 0.01 K/UL (ref 0–0.04)
IMM GRANULOCYTES NFR BLD AUTO: 0.1 % (ref 0–0.5)
LYMPHOCYTES # BLD AUTO: 2.5 K/UL (ref 1–4.8)
LYMPHOCYTES NFR BLD: 34 % (ref 18–48)
MCH RBC QN AUTO: 29.5 PG (ref 27–31)
MCHC RBC AUTO-ENTMCNC: 30.8 G/DL (ref 32–36)
MCV RBC AUTO: 96 FL (ref 82–98)
MONOCYTES # BLD AUTO: 0.7 K/UL (ref 0.3–1)
MONOCYTES NFR BLD: 9.6 % (ref 4–15)
NEUTROPHILS # BLD AUTO: 4 K/UL (ref 1.8–7.7)
NEUTROPHILS NFR BLD: 54 % (ref 38–73)
NRBC BLD-RTO: 0 /100 WBC
PLATELET # BLD AUTO: 234 K/UL (ref 150–350)
PMV BLD AUTO: 9.7 FL (ref 9.2–12.9)
POTASSIUM SERPL-SCNC: 4.5 MMOL/L (ref 3.5–5.1)
PROT SERPL-MCNC: 8.1 G/DL (ref 6–8.4)
RBC # BLD AUTO: 4.4 M/UL (ref 4–5.4)
SODIUM SERPL-SCNC: 140 MMOL/L (ref 136–145)
UUN UR-MCNC: 25 MG/DL (ref 7–18)
WBC # BLD AUTO: 7.41 K/UL (ref 3.9–12.7)

## 2020-12-16 PROCEDURE — 3008F BODY MASS INDEX DOCD: CPT | Mod: CPTII,S$GLB,, | Performed by: INTERNAL MEDICINE

## 2020-12-16 PROCEDURE — 3288F FALL RISK ASSESSMENT DOCD: CPT | Mod: CPTII,S$GLB,, | Performed by: INTERNAL MEDICINE

## 2020-12-16 PROCEDURE — 1159F PR MEDICATION LIST DOCUMENTED IN MEDICAL RECORD: ICD-10-PCS | Mod: S$GLB,,, | Performed by: INTERNAL MEDICINE

## 2020-12-16 PROCEDURE — 85025 COMPLETE CBC W/AUTO DIFF WBC: CPT

## 2020-12-16 PROCEDURE — 1159F MED LIST DOCD IN RCRD: CPT | Mod: S$GLB,,, | Performed by: INTERNAL MEDICINE

## 2020-12-16 PROCEDURE — 80053 COMPREHEN METABOLIC PANEL: CPT

## 2020-12-16 PROCEDURE — 3008F PR BODY MASS INDEX (BMI) DOCUMENTED: ICD-10-PCS | Mod: CPTII,S$GLB,, | Performed by: INTERNAL MEDICINE

## 2020-12-16 PROCEDURE — 99999 PR PBB SHADOW E&M-EST. PATIENT-LVL III: CPT | Mod: PBBFAC,,, | Performed by: INTERNAL MEDICINE

## 2020-12-16 PROCEDURE — 85025 COMPLETE CBC W/AUTO DIFF WBC: CPT | Mod: PN

## 2020-12-16 PROCEDURE — 80053 COMPREHEN METABOLIC PANEL: CPT | Mod: PN

## 2020-12-16 PROCEDURE — 99214 PR OFFICE/OUTPT VISIT, EST, LEVL IV, 30-39 MIN: ICD-10-PCS | Mod: S$GLB,,, | Performed by: INTERNAL MEDICINE

## 2020-12-16 PROCEDURE — 1126F PR PAIN SEVERITY QUANTIFIED, NO PAIN PRESENT: ICD-10-PCS | Mod: S$GLB,,, | Performed by: INTERNAL MEDICINE

## 2020-12-16 PROCEDURE — 1101F PR PT FALLS ASSESS DOC 0-1 FALLS W/OUT INJ PAST YR: ICD-10-PCS | Mod: CPTII,S$GLB,, | Performed by: INTERNAL MEDICINE

## 2020-12-16 PROCEDURE — 36415 COLL VENOUS BLD VENIPUNCTURE: CPT | Mod: PN

## 2020-12-16 PROCEDURE — 1126F AMNT PAIN NOTED NONE PRSNT: CPT | Mod: S$GLB,,, | Performed by: INTERNAL MEDICINE

## 2020-12-16 PROCEDURE — 99999 PR PBB SHADOW E&M-EST. PATIENT-LVL III: ICD-10-PCS | Mod: PBBFAC,,, | Performed by: INTERNAL MEDICINE

## 2020-12-16 PROCEDURE — 3288F PR FALLS RISK ASSESSMENT DOCUMENTED: ICD-10-PCS | Mod: CPTII,S$GLB,, | Performed by: INTERNAL MEDICINE

## 2020-12-16 PROCEDURE — 1101F PT FALLS ASSESS-DOCD LE1/YR: CPT | Mod: CPTII,S$GLB,, | Performed by: INTERNAL MEDICINE

## 2020-12-16 PROCEDURE — 99214 OFFICE O/P EST MOD 30 MIN: CPT | Mod: S$GLB,,, | Performed by: INTERNAL MEDICINE

## 2021-01-05 ENCOUNTER — OFFICE VISIT (OUTPATIENT)
Dept: OTOLARYNGOLOGY | Facility: CLINIC | Age: 66
End: 2021-01-05
Payer: MEDICARE

## 2021-01-05 VITALS — BODY MASS INDEX: 31.56 KG/M2 | HEIGHT: 62 IN | WEIGHT: 171.5 LBS

## 2021-01-05 DIAGNOSIS — J31.2 CHRONIC SORE THROAT: ICD-10-CM

## 2021-01-05 DIAGNOSIS — J31.0 RHINITIS, UNSPECIFIED TYPE: Primary | ICD-10-CM

## 2021-01-05 PROCEDURE — 1125F PR PAIN SEVERITY QUANTIFIED, PAIN PRESENT: ICD-10-PCS | Mod: S$GLB,,, | Performed by: OTOLARYNGOLOGY

## 2021-01-05 PROCEDURE — 99214 OFFICE O/P EST MOD 30 MIN: CPT | Mod: 25,S$GLB,, | Performed by: OTOLARYNGOLOGY

## 2021-01-05 PROCEDURE — 3288F FALL RISK ASSESSMENT DOCD: CPT | Mod: CPTII,S$GLB,, | Performed by: OTOLARYNGOLOGY

## 2021-01-05 PROCEDURE — 3288F PR FALLS RISK ASSESSMENT DOCUMENTED: ICD-10-PCS | Mod: CPTII,S$GLB,, | Performed by: OTOLARYNGOLOGY

## 2021-01-05 PROCEDURE — 1159F MED LIST DOCD IN RCRD: CPT | Mod: S$GLB,,, | Performed by: OTOLARYNGOLOGY

## 2021-01-05 PROCEDURE — 99214 PR OFFICE/OUTPT VISIT, EST, LEVL IV, 30-39 MIN: ICD-10-PCS | Mod: 25,S$GLB,, | Performed by: OTOLARYNGOLOGY

## 2021-01-05 PROCEDURE — 31575 PR LARYNGOSCOPY, FLEXIBLE; DIAGNOSTIC: ICD-10-PCS | Mod: S$GLB,,, | Performed by: OTOLARYNGOLOGY

## 2021-01-05 PROCEDURE — 1101F PT FALLS ASSESS-DOCD LE1/YR: CPT | Mod: CPTII,S$GLB,, | Performed by: OTOLARYNGOLOGY

## 2021-01-05 PROCEDURE — 99999 PR PBB SHADOW E&M-EST. PATIENT-LVL III: CPT | Mod: PBBFAC,,, | Performed by: OTOLARYNGOLOGY

## 2021-01-05 PROCEDURE — 1125F AMNT PAIN NOTED PAIN PRSNT: CPT | Mod: S$GLB,,, | Performed by: OTOLARYNGOLOGY

## 2021-01-05 PROCEDURE — 1101F PR PT FALLS ASSESS DOC 0-1 FALLS W/OUT INJ PAST YR: ICD-10-PCS | Mod: CPTII,S$GLB,, | Performed by: OTOLARYNGOLOGY

## 2021-01-05 PROCEDURE — 99999 PR PBB SHADOW E&M-EST. PATIENT-LVL III: ICD-10-PCS | Mod: PBBFAC,,, | Performed by: OTOLARYNGOLOGY

## 2021-01-05 PROCEDURE — 1159F PR MEDICATION LIST DOCUMENTED IN MEDICAL RECORD: ICD-10-PCS | Mod: S$GLB,,, | Performed by: OTOLARYNGOLOGY

## 2021-01-05 PROCEDURE — 3008F BODY MASS INDEX DOCD: CPT | Mod: CPTII,S$GLB,, | Performed by: OTOLARYNGOLOGY

## 2021-01-05 PROCEDURE — 3008F PR BODY MASS INDEX (BMI) DOCUMENTED: ICD-10-PCS | Mod: CPTII,S$GLB,, | Performed by: OTOLARYNGOLOGY

## 2021-01-05 PROCEDURE — 31575 DIAGNOSTIC LARYNGOSCOPY: CPT | Mod: S$GLB,,, | Performed by: OTOLARYNGOLOGY

## 2021-01-05 RX ORDER — LORATADINE 10 MG/1
10 TABLET ORAL DAILY
Qty: 30 TABLET | Refills: 11 | Status: SHIPPED | OUTPATIENT
Start: 2021-01-05 | End: 2022-02-23 | Stop reason: SDUPTHER

## 2021-02-23 PROBLEM — M77.01 MEDIAL EPICONDYLITIS OF ELBOW, RIGHT: Status: ACTIVE | Noted: 2021-02-23

## 2021-04-24 NOTE — PROGRESS NOTES
Subjective:       Patient ID:  Mandi Cline is a 61 y.o. female who presents for   Chief Complaint   Patient presents with    Growth     right lat breast x 10 yrs +, itchy change of colors raise no prev tx      This is a new patient here for evaluation of lesion to right breast      Growth  - Initial  Affected locations: Right breast.  Duration: 10 years  Signs / symptoms: asymptomatic  Exacerbated by: catches on clothes occasionally.  Relieving factors/Treatments tried: nothing        Review of Systems   Skin: Negative for daily sunscreen use, activity-related sunscreen use and recent sunburn.   Hematologic/Lymphatic: Does not bruise/bleed easily.        Objective:    Physical Exam   Constitutional: She appears well-developed and well-nourished. No distress.   Neurological: She is alert and oriented to person, place, and time. She is not disoriented.   Psychiatric: She has a normal mood and affect.   Skin:   Areas Examined (abnormalities noted in diagram):   Chest / Axilla Inspection Performed              Diagram Legend     Erythematous scaling macule/papule c/w actinic keratosis       Vascular papule c/w angioma      Pigmented verrucoid papule/plaque c/w seborrheic keratosis      Yellow umbilicated papule c/w sebaceous hyperplasia      Irregularly shaped tan macule c/w lentigo     1-2 mm smooth white papules consistent with Milia      Movable subcutaneous cyst with punctum c/w epidermal inclusion cyst      Subcutaneous movable cyst c/w pilar cyst      Firm pink to brown papule c/w dermatofibroma      Pedunculated fleshy papule(s) c/w skin tag(s)      Evenly pigmented macule c/w junctional nevus     Mildly variegated pigmented, slightly irregular-bordered macule c/w mildly atypical nevus      Flesh colored to evenly pigmented papule c/w intradermal nevus       Pink pearly papule/plaque c/w basal cell carcinoma      Erythematous hyperkeratotic cursted plaque c/w SCC      Surgical scar with no sign  of skin cancer recurrence      Open and closed comedones      Inflammatory papules and pustules      Verrucoid papule consistent consistent with wart     Erythematous eczematous patches and plaques     Dystrophic onycholytic nail with subungual debris c/w onychomycosis     Umbilicated papule    Erythematous-base heme-crusted tan verrucoid plaque consistent with inflamed seborrheic keratosis     Erythematous Silvery Scaling Plaque c/w Psoriasis     See annotation      Assessment / Plan:        Seborrheic keratoses  These are benign inherited growths without a malignant potential. Reassurance given to patient. No treatment is necessary.              Return if symptoms worsen or fail to improve.   Walk in

## 2021-06-17 ENCOUNTER — TELEPHONE (OUTPATIENT)
Dept: HEMATOLOGY/ONCOLOGY | Facility: CLINIC | Age: 66
End: 2021-06-17

## 2021-08-19 ENCOUNTER — PATIENT MESSAGE (OUTPATIENT)
Dept: HEMATOLOGY/ONCOLOGY | Facility: CLINIC | Age: 66
End: 2021-08-19

## 2021-08-19 ENCOUNTER — TELEPHONE (OUTPATIENT)
Dept: HEMATOLOGY/ONCOLOGY | Facility: CLINIC | Age: 66
End: 2021-08-19

## 2021-08-26 DIAGNOSIS — K21.9 GASTROESOPHAGEAL REFLUX DISEASE, UNSPECIFIED WHETHER ESOPHAGITIS PRESENT: ICD-10-CM

## 2021-08-26 RX ORDER — ESOMEPRAZOLE MAGNESIUM 40 MG/1
40 CAPSULE, DELAYED RELEASE ORAL
Qty: 60 CAPSULE | Refills: 5 | Status: SHIPPED | OUTPATIENT
Start: 2021-08-26 | End: 2022-05-24 | Stop reason: SDUPTHER

## 2021-08-26 RX ORDER — SUCRALFATE 1 G/1
1 TABLET ORAL
Qty: 60 TABLET | Refills: 2 | Status: SHIPPED | OUTPATIENT
Start: 2021-08-26 | End: 2022-03-28

## 2021-09-02 ENCOUNTER — PATIENT MESSAGE (OUTPATIENT)
Dept: GASTROENTEROLOGY | Facility: CLINIC | Age: 66
End: 2021-09-02

## 2021-09-20 ENCOUNTER — TELEPHONE (OUTPATIENT)
Dept: HEMATOLOGY/ONCOLOGY | Facility: CLINIC | Age: 66
End: 2021-09-20

## 2021-10-05 ENCOUNTER — PATIENT MESSAGE (OUTPATIENT)
Dept: HEMATOLOGY/ONCOLOGY | Facility: CLINIC | Age: 66
End: 2021-10-05

## 2021-10-05 ENCOUNTER — TELEPHONE (OUTPATIENT)
Dept: HEMATOLOGY/ONCOLOGY | Facility: CLINIC | Age: 66
End: 2021-10-05

## 2021-10-12 ENCOUNTER — TELEPHONE (OUTPATIENT)
Dept: HEMATOLOGY/ONCOLOGY | Facility: CLINIC | Age: 66
End: 2021-10-12

## 2021-10-13 ENCOUNTER — HOSPITAL ENCOUNTER (OUTPATIENT)
Dept: RADIOLOGY | Facility: HOSPITAL | Age: 66
Discharge: HOME | End: 2021-10-13
Attending: INTERNAL MEDICINE
Payer: MEDICARE

## 2021-10-13 DIAGNOSIS — Z12.31 ENCOUNTER FOR SCREENING MAMMOGRAM FOR MALIGNANT NEOPLASM OF BREAST: ICD-10-CM

## 2021-10-13 DIAGNOSIS — Z86.718 HISTORY OF DVT OF LOWER EXTREMITY: ICD-10-CM

## 2021-10-13 DIAGNOSIS — Z09 ONCOLOGY FOLLOW-UP ENCOUNTER: ICD-10-CM

## 2021-10-13 DIAGNOSIS — Z85.3 HISTORY OF BREAST CANCER: ICD-10-CM

## 2021-10-13 DIAGNOSIS — E66.9 OBESITY (BMI 30-39.9): ICD-10-CM

## 2021-10-13 PROCEDURE — 77067 SCR MAMMO BI INCL CAD: CPT | Mod: TC,PO

## 2021-10-13 PROCEDURE — 77067 SCR MAMMO BI INCL CAD: CPT | Mod: 26,,, | Performed by: RADIOLOGY

## 2021-10-13 PROCEDURE — 77063 MAMMO DIGITAL SCREENING BILAT WITH TOMO: ICD-10-PCS | Mod: 26,,, | Performed by: RADIOLOGY

## 2021-10-13 PROCEDURE — 77063 BREAST TOMOSYNTHESIS BI: CPT | Mod: 26,,, | Performed by: RADIOLOGY

## 2021-10-13 PROCEDURE — 77067 MAMMO DIGITAL SCREENING BILAT WITH TOMO: ICD-10-PCS | Mod: 26,,, | Performed by: RADIOLOGY

## 2021-11-12 ENCOUNTER — OFFICE VISIT (OUTPATIENT)
Dept: HEMATOLOGY/ONCOLOGY | Facility: CLINIC | Age: 66
End: 2021-11-12
Payer: MEDICARE

## 2021-11-12 VITALS
OXYGEN SATURATION: 98 % | SYSTOLIC BLOOD PRESSURE: 138 MMHG | HEART RATE: 88 BPM | TEMPERATURE: 99 F | BODY MASS INDEX: 36.18 KG/M2 | WEIGHT: 184.31 LBS | DIASTOLIC BLOOD PRESSURE: 84 MMHG | RESPIRATION RATE: 18 BRPM | HEIGHT: 60 IN

## 2021-11-12 DIAGNOSIS — Z85.3 HISTORY OF BREAST CANCER: Primary | ICD-10-CM

## 2021-11-12 PROCEDURE — 99999 PR PBB SHADOW E&M-EST. PATIENT-LVL III: ICD-10-PCS | Mod: PBBFAC,,, | Performed by: INTERNAL MEDICINE

## 2021-11-12 PROCEDURE — 99213 OFFICE O/P EST LOW 20 MIN: CPT | Mod: PBBFAC,PN | Performed by: INTERNAL MEDICINE

## 2021-11-12 PROCEDURE — 99213 PR OFFICE/OUTPT VISIT, EST, LEVL III, 20-29 MIN: ICD-10-PCS | Mod: S$GLB,,, | Performed by: INTERNAL MEDICINE

## 2021-11-12 PROCEDURE — 99213 OFFICE O/P EST LOW 20 MIN: CPT | Mod: S$GLB,,, | Performed by: INTERNAL MEDICINE

## 2021-11-12 PROCEDURE — 99999 PR PBB SHADOW E&M-EST. PATIENT-LVL III: CPT | Mod: PBBFAC,,, | Performed by: INTERNAL MEDICINE

## 2021-11-12 RX ORDER — CYCLOBENZAPRINE HCL 5 MG
TABLET ORAL
COMMUNITY
End: 2022-05-24

## 2021-11-12 RX ORDER — MELOXICAM 7.5 MG/1
7.5 TABLET ORAL DAILY
COMMUNITY
Start: 2021-08-21 | End: 2022-08-09

## 2022-08-09 PROBLEM — R94.2 ABNORMAL PFT: Status: ACTIVE | Noted: 2022-08-09

## 2022-10-19 DIAGNOSIS — Z85.3 HISTORY OF BREAST CANCER: Primary | ICD-10-CM

## 2022-11-17 ENCOUNTER — HOSPITAL ENCOUNTER (OUTPATIENT)
Dept: RADIOLOGY | Facility: HOSPITAL | Age: 67
Discharge: HOME | End: 2022-11-17
Attending: INTERNAL MEDICINE
Payer: MEDICARE

## 2022-11-17 DIAGNOSIS — Z85.3 HISTORY OF BREAST CANCER: ICD-10-CM

## 2022-11-17 DIAGNOSIS — J45.909 ASTHMA DUE TO SEASONAL ALLERGIES: ICD-10-CM

## 2022-11-17 PROCEDURE — 77067 SCR MAMMO BI INCL CAD: CPT | Mod: TC,PO

## 2022-11-17 PROCEDURE — 71046 X-RAY EXAM CHEST 2 VIEWS: CPT | Mod: TC,FY,PO

## 2022-11-17 PROCEDURE — 71046 X-RAY EXAM CHEST 2 VIEWS: CPT | Mod: 26,,, | Performed by: RADIOLOGY

## 2022-11-17 PROCEDURE — 77063 BREAST TOMOSYNTHESIS BI: CPT | Mod: 26,,, | Performed by: RADIOLOGY

## 2022-11-17 PROCEDURE — 77067 SCR MAMMO BI INCL CAD: CPT | Mod: 26,,, | Performed by: RADIOLOGY

## 2022-11-17 PROCEDURE — 77063 BREAST TOMOSYNTHESIS BI: CPT | Mod: TC,PO

## 2022-11-17 PROCEDURE — 77063 MAMMO DIGITAL SCREENING BILAT WITH TOMO: ICD-10-PCS | Mod: 26,,, | Performed by: RADIOLOGY

## 2022-11-17 PROCEDURE — 71046 XR CHEST PA AND LATERAL: ICD-10-PCS | Mod: 26,,, | Performed by: RADIOLOGY

## 2022-11-17 PROCEDURE — 77067 MAMMO DIGITAL SCREENING BILAT WITH TOMO: ICD-10-PCS | Mod: 26,,, | Performed by: RADIOLOGY

## 2022-11-21 ENCOUNTER — OFFICE VISIT (OUTPATIENT)
Dept: HEMATOLOGY/ONCOLOGY | Facility: CLINIC | Age: 67
End: 2022-11-21
Payer: MEDICARE

## 2022-11-21 VITALS
OXYGEN SATURATION: 97 % | RESPIRATION RATE: 16 BRPM | SYSTOLIC BLOOD PRESSURE: 149 MMHG | WEIGHT: 194.69 LBS | DIASTOLIC BLOOD PRESSURE: 78 MMHG | HEART RATE: 83 BPM | BODY MASS INDEX: 38.22 KG/M2 | HEIGHT: 60 IN

## 2022-11-21 DIAGNOSIS — Z12.31 ENCOUNTER FOR SCREENING MAMMOGRAM FOR MALIGNANT NEOPLASM OF BREAST: ICD-10-CM

## 2022-11-21 DIAGNOSIS — Z85.3 HISTORY OF BREAST CANCER: Primary | ICD-10-CM

## 2022-11-21 PROCEDURE — 1101F PT FALLS ASSESS-DOCD LE1/YR: CPT | Mod: CPTII,S$GLB,, | Performed by: INTERNAL MEDICINE

## 2022-11-21 PROCEDURE — 1159F PR MEDICATION LIST DOCUMENTED IN MEDICAL RECORD: ICD-10-PCS | Mod: CPTII,S$GLB,, | Performed by: INTERNAL MEDICINE

## 2022-11-21 PROCEDURE — 3078F PR MOST RECENT DIASTOLIC BLOOD PRESSURE < 80 MM HG: ICD-10-PCS | Mod: CPTII,S$GLB,, | Performed by: INTERNAL MEDICINE

## 2022-11-21 PROCEDURE — 3008F PR BODY MASS INDEX (BMI) DOCUMENTED: ICD-10-PCS | Mod: CPTII,S$GLB,, | Performed by: INTERNAL MEDICINE

## 2022-11-21 PROCEDURE — 3077F PR MOST RECENT SYSTOLIC BLOOD PRESSURE >= 140 MM HG: ICD-10-PCS | Mod: CPTII,S$GLB,, | Performed by: INTERNAL MEDICINE

## 2022-11-21 PROCEDURE — 99213 PR OFFICE/OUTPT VISIT, EST, LEVL III, 20-29 MIN: ICD-10-PCS | Mod: S$GLB,,, | Performed by: INTERNAL MEDICINE

## 2022-11-21 PROCEDURE — 1160F RVW MEDS BY RX/DR IN RCRD: CPT | Mod: CPTII,S$GLB,, | Performed by: INTERNAL MEDICINE

## 2022-11-21 PROCEDURE — 99999 PR PBB SHADOW E&M-EST. PATIENT-LVL III: ICD-10-PCS | Mod: PBBFAC,,, | Performed by: INTERNAL MEDICINE

## 2022-11-21 PROCEDURE — 99213 OFFICE O/P EST LOW 20 MIN: CPT | Mod: S$GLB,,, | Performed by: INTERNAL MEDICINE

## 2022-11-21 PROCEDURE — 1101F PR PT FALLS ASSESS DOC 0-1 FALLS W/OUT INJ PAST YR: ICD-10-PCS | Mod: CPTII,S$GLB,, | Performed by: INTERNAL MEDICINE

## 2022-11-21 PROCEDURE — 3078F DIAST BP <80 MM HG: CPT | Mod: CPTII,S$GLB,, | Performed by: INTERNAL MEDICINE

## 2022-11-21 PROCEDURE — 1160F PR REVIEW ALL MEDS BY PRESCRIBER/CLIN PHARMACIST DOCUMENTED: ICD-10-PCS | Mod: CPTII,S$GLB,, | Performed by: INTERNAL MEDICINE

## 2022-11-21 PROCEDURE — 1159F MED LIST DOCD IN RCRD: CPT | Mod: CPTII,S$GLB,, | Performed by: INTERNAL MEDICINE

## 2022-11-21 PROCEDURE — 1125F PR PAIN SEVERITY QUANTIFIED, PAIN PRESENT: ICD-10-PCS | Mod: CPTII,S$GLB,, | Performed by: INTERNAL MEDICINE

## 2022-11-21 PROCEDURE — 3077F SYST BP >= 140 MM HG: CPT | Mod: CPTII,S$GLB,, | Performed by: INTERNAL MEDICINE

## 2022-11-21 PROCEDURE — 3288F FALL RISK ASSESSMENT DOCD: CPT | Mod: CPTII,S$GLB,, | Performed by: INTERNAL MEDICINE

## 2022-11-21 PROCEDURE — 3288F PR FALLS RISK ASSESSMENT DOCUMENTED: ICD-10-PCS | Mod: CPTII,S$GLB,, | Performed by: INTERNAL MEDICINE

## 2022-11-21 PROCEDURE — 3008F BODY MASS INDEX DOCD: CPT | Mod: CPTII,S$GLB,, | Performed by: INTERNAL MEDICINE

## 2022-11-21 PROCEDURE — 1125F AMNT PAIN NOTED PAIN PRSNT: CPT | Mod: CPTII,S$GLB,, | Performed by: INTERNAL MEDICINE

## 2022-11-21 PROCEDURE — 99999 PR PBB SHADOW E&M-EST. PATIENT-LVL III: CPT | Mod: PBBFAC,,, | Performed by: INTERNAL MEDICINE

## 2022-11-21 NOTE — PROGRESS NOTES
PROGRESS NOTE    Subjective:       Patient ID: Mandi Cline is a 67 y.o. female.  MRN: [id number]  : 1955    Chief Complaint: DCIS    History of Present Illness:   Mandi Cline is a 67 y.o. female who presents with breast cancer.  She was last seen in the oncology clinic in 2020 by .   Prior to that, she had seen  Dr Guadalupe.     As previously documented, she is originally  from [place]. Her history dated back to 2015 when a routine screening mammogram shows calcification in the superior part of left breast.      Core needle biopsy of 2 lesions showed high-grade DCIS.  Cell strongly ER positive but only 10% will mildly FL positive.      Patient was referred to Dr. Richards and underwent initial wire localization lumpectomy on 2015.  Pathology revealed extensive solid high-grade DCIS measuring 4 cm in diameter with extensive comedonecrosis.  Due to extremely close margin patient underwent re-excision on 2015.  Analysis pathology shows positive margin subsequently patient return to OR on 2015 for further re-excision of positive margin.  Pathology from 2015 review no residual DCIS a margin is close at 0.6 mm.      Patient underwent radiation therapy to the breast.  She completed her radiation in 2016 and she was referred to Oncology at that time for evaluation and treatment.      Adjuvant hormone therapy was against it due to prior history of DVT x2.     Interim history:   She presents today to discuss her symptoms, labs, mammogram results and further recommendations.   She denies any new complaints    Oncology History:  Oncology History    No history exists.       History:  Past Medical History:   Diagnosis Date    Asthma due to seasonal allergies     seasonal, last attack was 5 years ago.    Back pain     Breast cancer     left breast     Bronchitis     usually has once a year    GERD (gastroesophageal reflux disease)     H/O total knee replacement, right     History of breast cancer, left breast, remission     Dx 2015; remission since 2016; s/p 3 surgeries in breast and radiation.    History of DVT of lower extremity     in her younger years    Osteoarthritis of both knees     knees    Osteopenia     Prediabetes     Venous reflux     Wears glasses       Past Surgical History:   Procedure Laterality Date    BREAST BIOPSY Left     2015    BREAST LUMPECTOMY  9-29-15    BREAST SURGERY  9/17/2015    left biopsy    CERVICAL CONIZATION   W/ LASER      CHOLECYSTECTOMY      COLONOSCOPY N/A 10/19/2015    Procedure: COLONOSCOPY;  Surgeon: Manohar Woodruff MD;  Location: [facility] ENDO;  Service: Endoscopy;  Laterality: N/A;    COLONOSCOPY N/A 3/2/2016    Procedure: COLONOSCOPY;  Surgeon: Manohar Woodruff MD;  Location: [facility];  Service: Endoscopy;  Laterality: N/A;    ESOPHAGOGASTRODUODENOSCOPY N/A 11/24/2020    Procedure: EGD (ESOPHAGOGASTRODUODENOSCOPY);  Surgeon: Anthony Lee Jr., MD;  Location: [facility] ENDO;  Service: Endoscopy;  Laterality: N/A;    HYSTERECTOMY      total hysterectomy for benign reasons    radidation Left     2016    TOTAL KNEE ARTHROPLASTY Right      Family History   Problem Relation Age of Onset    Diabetes Mother     Cancer Mother         pelvic    Birth defects Maternal Grandmother     Birth defects Paternal Grandfather     Alzheimer's disease Father     Diabetes Sister     No Known Problems Brother     No Known Problems Maternal Grandfather     No Known Problems Paternal Grandmother     No Known Problems Brother     No Known Problems Brother     No Known Problems Son     No Known Problems Son     No Known Problems Daughter     No Known Problems Daughter     No Known Problems Daughter      Social History     Tobacco Use    Smoking status: Former     Packs/day: 0.25     Years: 2.00     Pack years: 0.50     Types: Cigarettes     Quit  date: 2004     Years since quittin.9    Smokeless tobacco: Never   Substance and Sexual Activity    Alcohol use: No    Drug use: No    Sexual activity: Not Currently     Partners: Male        ROS:   Review of Systems   Constitutional:  Negative for fever, malaise/fatigue and weight loss.   HENT:  Negative for congestion, hearing loss, nosebleeds and sore throat.    Eyes:  Negative for double vision and photophobia.   Respiratory:  Negative for cough, hemoptysis, sputum production, shortness of breath and wheezing.    Cardiovascular:  Negative for chest pain, palpitations, orthopnea and leg swelling.   Gastrointestinal:  Negative for abdominal pain, blood in stool, constipation, diarrhea, heartburn, nausea and vomiting.   Genitourinary:  Negative for dysuria, hematuria and urgency.   Musculoskeletal:  Negative for back pain, joint pain and myalgias.   Skin:  Negative for itching and rash.   Neurological:  Negative for dizziness, tingling, seizures, weakness and headaches.   Endo/Heme/Allergies:  Negative for polydipsia. Does not bruise/bleed easily.   Psychiatric/Behavioral:  Negative for depression and memory loss. The patient is not nervous/anxious and does not have insomnia.       Objective:     Vitals:    22 1355   BP: (!) 149/78   Pulse: 83   Resp: 16   SpO2: 97%   Weight: 88.3 kg (194 lb 10.7 oz)   Height: 5' (1.524 m)   PainSc:   6   PainLoc: Back       Wt Readings from Last 10 Encounters:   22 88.3 kg (194 lb 10.7 oz)   22 86.2 kg (190 lb)   22 83.9 kg (185 lb)   22 84.2 kg (185 lb 11.2 oz)   21 83.6 kg (184 lb 4.9 oz)   21 83 kg (183 lb)   21 83 kg (183 lb)   21 83 kg (183 lb)   02/10/21 83 kg (183 lb)   21 80.7 kg (178 lb)       Physical Examination:   Physical Exam  Vitals and nursing note reviewed.   Constitutional:       General: She is not in acute distress.     Appearance: She is not diaphoretic.   HENT:      Head: Normocephalic.       Mouth/Throat:      Pharynx: No oropharyngeal exudate.   Eyes:      General: No scleral icterus.     Conjunctiva/sclera: Conjunctivae normal.   Neck:      Thyroid: No thyromegaly.   Cardiovascular:      Rate and Rhythm: Normal rate and regular rhythm.      Heart sounds: Normal heart sounds. No murmur heard.  Pulmonary:      Effort: Pulmonary effort is normal. No respiratory distress.      Breath sounds: No stridor. No wheezing or rales.   Chest:      Chest wall: No tenderness.   Abdominal:      General: Bowel sounds are normal. There is no distension.      Palpations: Abdomen is soft. There is no mass.      Tenderness: There is no abdominal tenderness. There is no rebound.   Musculoskeletal:         General: No tenderness or deformity. Normal range of motion.      Cervical back: Neck supple.   Lymphadenopathy:      Cervical: No cervical adenopathy.   Skin:     General: Skin is warm and dry.      Findings: No erythema or rash.   Neurological:      Mental Status: She is alert and oriented to person, place, and time.      Cranial Nerves: No cranial nerve deficit.      Coordination: Coordination normal.      Gait: Gait is intact.   Psychiatric:         Mood and Affect: Affect normal.         Cognition and Memory: Memory normal.         Judgment: Judgment normal.        Diagnostic Tests:  Significant Imaging: I have reviewed and interpreted all pertinent imaging results/findings.    Laboratory Data:  All pertinent labs have been reviewed.  Labs:   Lab Results   Component Value Date    WBC 7.12 11/17/2022    RBC 4.65 11/17/2022    HGB 13.9 11/17/2022    HCT 43.9 11/17/2022    MCV 94 11/17/2022     11/17/2022     (H) 11/17/2022     11/17/2022    K 3.9 11/17/2022    BUN 14 11/17/2022    CREATININE 0.9 11/17/2022    AST 26 11/17/2022    ALT 30 11/17/2022    BILITOT 0.6 11/17/2022       Assessment/Plan:   History of breast cancer-Ductal carcinoma in situ (DCIS) of left breast  Continue active  surveillance.       Results of recent labs and Mammogram discussed with the patient.  There is no evidence of recurrent disease.  I will see her back in 1 year with labs and mammogram.    Hyperglycemia is noted on non fasting labs, she will discuss with her PMD tomorrow.       ECOG SCORE    0 - Fully active-able to carry on all pre-disease performance without restriction           Discussion:   No follow-ups on file.    Plan was discussed with the patient at length, and she verbalized understanding. Mandi Reveles was given an opportunity to ask questions that were answered to her satisfaction, and she was advised to call in the interval if any problems or questions arise.    Electronically signed by Funmi Everett MD    Route Chart for Scheduling    Med Onc Chart Routing      Follow up with physician 1 year. labs and mammo just before visit   Follow up with [facility]    Infusion scheduling note    Injection scheduling note    Labs CBC and CMP   Lab interval:     Imaging Mammogram      Pharmacy appointment    Other referrals

## 2023-11-01 NOTE — PROGRESS NOTES
Notified via portal   Subjective:       Patient ID: Mandi Cline is a 62 y.o. female.    Chief Complaint: Leg Swelling (Follow up with labs/holter prior:  numbness at times in legs)    The patient is here for a follow-up visit.  She tells me she has been seeing Dr. Bushra mathew in Orthopedics and he has injected her knees as well  started her in physical therapy in her knees are doing much better.  The she does tell me she is having some numbness and tingling of the lower extremities.  It is predominantly the bottom of her legs.  This has been present over the past couple months.  She denies any weakness of the lower extremities.    She does have prediabetes as well. Lab Results       Component                Value               Date                       HGBA1C                   6.3 (H)             07/02/2018            She is not watching her diet or routinely exercising.  She has not lost any weight since our last visit.    the patient does have a vitamin-D deficiency and tells me she did not get the prescription vitamin-D that was sent to the pharmacy for her.      Review of Systems   Constitutional: Negative for appetite change, chills and fever.   HENT: Negative for ear pain and postnasal drip.    Eyes: Negative for pain and itching.   Respiratory: Negative for chest tightness and shortness of breath.    Gastrointestinal: Negative for abdominal distention and abdominal pain.   Endocrine: Negative for polydipsia and polyuria.   Genitourinary: Negative for difficulty urinating and flank pain.   Skin: Negative for color change and pallor.   Neurological: Negative for light-headedness and headaches.   Hematological: Negative for adenopathy. Does not bruise/bleed easily.   Psychiatric/Behavioral: Negative for agitation.       Past medical, surgical, family and social history reviewed.  Objective:     Vitals:    08/20/18 1124   BP: 108/60   Pulse: 76   Resp: 18   Temp: 98.6 °F (37 °C)   TempSrc: Oral   Weight: 75.8 kg (167  lb)   Height: 5' (1.524 m)   PainSc: 0-No pain     Body mass index is 32.61 kg/m².     Physical Exam   Constitutional: She is oriented to person, place, and time. She appears well-developed and well-nourished.   HENT:   Head: Normocephalic and atraumatic.   Right Ear: External ear normal.   Left Ear: External ear normal.   Nose: Nose normal.   Mouth/Throat: Oropharynx is clear and moist.   Eyes: Conjunctivae are normal. Right eye exhibits no discharge. Left eye exhibits no discharge. No scleral icterus.   Neck: Normal range of motion. Neck supple. No tracheal deviation present.   Cardiovascular: Normal rate, regular rhythm and normal heart sounds. Exam reveals no friction rub.   No murmur heard.  Pulmonary/Chest: Effort normal and breath sounds normal. No stridor. No respiratory distress. She has no wheezes. She has no rales. She exhibits no tenderness.   Musculoskeletal: Normal range of motion.   Lymphadenopathy:     She has no cervical adenopathy.   Neurological: She is alert and oriented to person, place, and time.   Skin: Skin is warm and dry.   Psychiatric: She has a normal mood and affect.       Assessment:       1. Pain in both lower extremities    2. Prediabetes    3. Obesity (BMI 30-39.9)    4. Numbness and tingling of lower extremity    5. Vitamin D deficiency        Plan:       Mandi was seen today for leg swelling.    Diagnoses and all orders for this visit:    Pain in both lower extremities  -     US Lower Extremity Veins Bilateral Insuf; Future    Prediabetes  Diet and exercise discussed.    Obesity (BMI 30-39.9)  Weight loss recommended    Numbness and tingling of lower extremity  Check ultrasound    Vitamin D deficiency    -     ergocalciferol (ERGOCALCIFEROL) 50,000 unit Cap; Take 1 capsule (50,000 Units total) by mouth every 7 days.